# Patient Record
Sex: FEMALE | Race: WHITE | HISPANIC OR LATINO | Employment: FULL TIME | URBAN - METROPOLITAN AREA
[De-identification: names, ages, dates, MRNs, and addresses within clinical notes are randomized per-mention and may not be internally consistent; named-entity substitution may affect disease eponyms.]

---

## 2019-11-11 ENCOUNTER — HOSPITAL ENCOUNTER (OUTPATIENT)
Dept: LAB | Facility: MEDICAL CENTER | Age: 54
End: 2019-11-11
Attending: NURSE PRACTITIONER
Payer: COMMERCIAL

## 2019-11-11 LAB
ALBUMIN SERPL BCP-MCNC: 3.8 G/DL (ref 3.2–4.9)
ALBUMIN/GLOB SERPL: 1.1 G/DL
ALP SERPL-CCNC: 76 U/L (ref 30–99)
ALT SERPL-CCNC: 19 U/L (ref 2–50)
ANION GAP SERPL CALC-SCNC: 5 MMOL/L (ref 0–11.9)
AST SERPL-CCNC: 16 U/L (ref 12–45)
BASOPHILS # BLD AUTO: 0.4 % (ref 0–1.8)
BASOPHILS # BLD: 0.02 K/UL (ref 0–0.12)
BILIRUB SERPL-MCNC: 0.5 MG/DL (ref 0.1–1.5)
BUN SERPL-MCNC: 13 MG/DL (ref 8–22)
CALCIUM SERPL-MCNC: 8.9 MG/DL (ref 8.5–10.5)
CHLORIDE SERPL-SCNC: 103 MMOL/L (ref 96–112)
CHOLEST SERPL-MCNC: 213 MG/DL (ref 100–199)
CO2 SERPL-SCNC: 29 MMOL/L (ref 20–33)
CREAT SERPL-MCNC: 0.56 MG/DL (ref 0.5–1.4)
EOSINOPHIL # BLD AUTO: 0.14 K/UL (ref 0–0.51)
EOSINOPHIL NFR BLD: 2.5 % (ref 0–6.9)
ERYTHROCYTE [DISTWIDTH] IN BLOOD BY AUTOMATED COUNT: 42.5 FL (ref 35.9–50)
EST. AVERAGE GLUCOSE BLD GHB EST-MCNC: 209 MG/DL
GLOBULIN SER CALC-MCNC: 3.5 G/DL (ref 1.9–3.5)
GLUCOSE SERPL-MCNC: 123 MG/DL (ref 65–99)
HBA1C MFR BLD: 8.9 % (ref 0–5.6)
HCT VFR BLD AUTO: 39.3 % (ref 37–47)
HDLC SERPL-MCNC: 62 MG/DL
HGB BLD-MCNC: 12.9 G/DL (ref 12–16)
IMM GRANULOCYTES # BLD AUTO: 0 K/UL (ref 0–0.11)
IMM GRANULOCYTES NFR BLD AUTO: 0 % (ref 0–0.9)
LDLC SERPL CALC-MCNC: 135 MG/DL
LYMPHOCYTES # BLD AUTO: 1.91 K/UL (ref 1–4.8)
LYMPHOCYTES NFR BLD: 34.6 % (ref 22–41)
MCH RBC QN AUTO: 27.6 PG (ref 27–33)
MCHC RBC AUTO-ENTMCNC: 32.8 G/DL (ref 33.6–35)
MCV RBC AUTO: 84.2 FL (ref 81.4–97.8)
MONOCYTES # BLD AUTO: 0.44 K/UL (ref 0–0.85)
MONOCYTES NFR BLD AUTO: 8 % (ref 0–13.4)
NEUTROPHILS # BLD AUTO: 3.01 K/UL (ref 2–7.15)
NEUTROPHILS NFR BLD: 54.5 % (ref 44–72)
NRBC # BLD AUTO: 0 K/UL
NRBC BLD-RTO: 0 /100 WBC
PLATELET # BLD AUTO: 309 K/UL (ref 164–446)
PMV BLD AUTO: 11.7 FL (ref 9–12.9)
POTASSIUM SERPL-SCNC: 4.1 MMOL/L (ref 3.6–5.5)
PROT SERPL-MCNC: 7.3 G/DL (ref 6–8.2)
RBC # BLD AUTO: 4.67 M/UL (ref 4.2–5.4)
SODIUM SERPL-SCNC: 137 MMOL/L (ref 135–145)
TRIGL SERPL-MCNC: 79 MG/DL (ref 0–149)
WBC # BLD AUTO: 5.5 K/UL (ref 4.8–10.8)

## 2019-11-11 PROCEDURE — 80061 LIPID PANEL: CPT

## 2019-11-11 PROCEDURE — 36415 COLL VENOUS BLD VENIPUNCTURE: CPT

## 2019-11-11 PROCEDURE — 83036 HEMOGLOBIN GLYCOSYLATED A1C: CPT

## 2019-11-11 PROCEDURE — 85025 COMPLETE CBC W/AUTO DIFF WBC: CPT

## 2019-11-11 PROCEDURE — 80053 COMPREHEN METABOLIC PANEL: CPT

## 2019-11-27 ENCOUNTER — ANTICOAGULATION VISIT (OUTPATIENT)
Dept: VASCULAR LAB | Facility: MEDICAL CENTER | Age: 54
End: 2019-11-27
Attending: INTERNAL MEDICINE
Payer: COMMERCIAL

## 2019-11-27 DIAGNOSIS — I82.4Y9 DEEP VEIN THROMBOSIS (DVT) OF PROXIMAL LOWER EXTREMITY, UNSPECIFIED CHRONICITY, UNSPECIFIED LATERALITY (HCC): ICD-10-CM

## 2019-11-27 PROBLEM — I82.409 DEEP VEIN THROMBOSIS (HCC): Status: ACTIVE | Noted: 2019-11-27

## 2019-11-27 PROCEDURE — 99213 OFFICE O/P EST LOW 20 MIN: CPT | Performed by: PHARMACIST

## 2019-11-28 NOTE — PROGRESS NOTES
Indication: 2nd DVT     Drug: Eliquis 5mg BID    Saw patient today using  over phone, total visit time was over a hour.       S/O:  Pt reports that she got a second idiopathic VTE about 7-8 months ago while in [place]. Was started on Eliquis 5mg QD. Was on this for some time and then once she came here was changed to Eliquis 5mg BID. Since this time the pain in her leg remains ana when she's been sitting for a while and she has pain/tingling in arm off and on.     Eliquis has become too expensive and is looking into cheaper alternatives that's why she was referred to the clinic. 30 day supply $120.     Bleeding Risk Assessment     NO -  Epistaxis   Pt denies any excessive or unusual bleeding/hematomas.  Pt denies any GI bleeds or hematemesis.  Pt denies any concerning daily headache or sub dural hematoma symptoms.     Pt denies any hematuria or abnormal vaginal bleeding.   Latest Hemoglobin - needs labs   ETOH overuse - denies     Creatinine Clearance/Renal Function     Latest ClCr - needs labs     Drug Interactions   Platelets: - needs labs   ASA/other antiplatelets - no   NSAID - was using naproxen PRN, advised to avoid and only use APAP   Other drug interactions - none    Verified no anticonvulsant or azole therapy, education provided for future use.      Final Assessment and Recommendations:   At this point it's hard to figure out the time line. Of concern is that she was under dosed on Eliquis for months with the DVT and yet pain remains and arm pain is present. Gave patient Eliquis 5mg samples and instructed her to take 10mg BID for the next 7 days.     Follow up:  Set Appt with Dr. Moore for 1 week.        Kary Rosenberg, PharmD

## 2019-12-05 ENCOUNTER — OFFICE VISIT (OUTPATIENT)
Dept: VASCULAR LAB | Facility: MEDICAL CENTER | Age: 54
End: 2019-12-05
Attending: INTERNAL MEDICINE
Payer: COMMERCIAL

## 2019-12-05 VITALS
BODY MASS INDEX: 36.63 KG/M2 | HEART RATE: 82 BPM | DIASTOLIC BLOOD PRESSURE: 44 MMHG | HEIGHT: 61 IN | SYSTOLIC BLOOD PRESSURE: 112 MMHG | WEIGHT: 194 LBS

## 2019-12-05 DIAGNOSIS — E11.65 UNCONTROLLED TYPE 2 DIABETES MELLITUS WITH HYPERGLYCEMIA (HCC): ICD-10-CM

## 2019-12-05 DIAGNOSIS — I82.4Y9 DEEP VEIN THROMBOSIS (DVT) OF PROXIMAL LOWER EXTREMITY, UNSPECIFIED CHRONICITY, UNSPECIFIED LATERALITY (HCC): ICD-10-CM

## 2019-12-05 DIAGNOSIS — Z79.01 CHRONIC ANTICOAGULATION: ICD-10-CM

## 2019-12-05 DIAGNOSIS — E78.49 OTHER HYPERLIPIDEMIA: ICD-10-CM

## 2019-12-05 DIAGNOSIS — Z86.718 HISTORY OF DVT (DEEP VEIN THROMBOSIS): ICD-10-CM

## 2019-12-05 PROCEDURE — 99212 OFFICE O/P EST SF 10 MIN: CPT | Performed by: FAMILY MEDICINE

## 2019-12-05 PROCEDURE — 99214 OFFICE O/P EST MOD 30 MIN: CPT | Performed by: FAMILY MEDICINE

## 2019-12-05 RX ORDER — LISINOPRIL 2.5 MG/1
TABLET ORAL
COMMUNITY

## 2019-12-05 RX ORDER — ATORVASTATIN CALCIUM 20 MG/1
TABLET, FILM COATED ORAL
COMMUNITY

## 2019-12-05 RX ORDER — PEN NEEDLE, DIABETIC 31 G X1/4"
NEEDLE, DISPOSABLE MISCELLANEOUS
Refills: 1 | COMMUNITY
Start: 2019-09-13

## 2019-12-05 ASSESSMENT — ENCOUNTER SYMPTOMS
BLOOD IN STOOL: 0
NAUSEA: 0
PALPITATIONS: 0
CHILLS: 0
INSOMNIA: 0
WEAKNESS: 0
BRUISES/BLEEDS EASILY: 0
DEPRESSION: 0
DIZZINESS: 0
ABDOMINAL PAIN: 0
BLURRED VISION: 0
HEMOPTYSIS: 0
FEVER: 0
TREMORS: 0
SEIZURES: 0
MYALGIAS: 1
HEADACHES: 0
NERVOUS/ANXIOUS: 0
SHORTNESS OF BREATH: 0
ORTHOPNEA: 0
DOUBLE VISION: 0
VOMITING: 0
DIARRHEA: 0
SORE THROAT: 0
FOCAL WEAKNESS: 0
WHEEZING: 0
COUGH: 0

## 2019-12-05 NOTE — PROGRESS NOTES
INITIAL VASCULAR VISIT  Subjective:   Kaya Keita is a 53 y.o. female who presents today 12/5/2019 for   Chief Complaint   Patient presents with   • Follow-Up     Deep Vein Thrombosis   Referred for LOT determination of anticoagulation in context of acute venothromboembolic disease    HPI:    VTE disease / Anticoagulation:   Current symptoms:  Reports intermittent RLE lateral thigh pain and right heel pain.  Believes pain differs from prior DVT-related pain.  Has hx of plantar fasciitis.  Having leg cramps intermittmently.  Denies current SOB, CP, leg cyanosis of digits, redness of extremities.  Anticoagulant: eliquis, having trouble with cost   Complications: none, tolerating well, no bleeding or bruising   Date of initiation of anticoagulation: ? 4/2019   Adherence: reports complete, no missed doses      Pertinent VTE pmhx:   Date of Diagnosis: around3/2019  Type of Venous thromboembolic disease (VTE): acute BLE DVT   Type of imaging: duplex in [place]  Preceding/presenting symptoms: severe bilat leg pains  VTE tx course: dx in [place], started on eliquis 5mg once daily in [place], then increased to 5mg BID once in .  Seen by clinical pharm on 11/27 and referred for eval due to expense of eliquis and pain issues.   Any personal VTE hx? Yes, Details: one additional prior unprovoked DVT , 6mo prior to 3/2019, used Eliquis for 2mo only  Any family VTE hx? No    UNPROVOKED VS PROVOKED:   Recent surgery ? No  Recent trauma ? No  Smoker? No  Extended travel? No  Other periods of immobility? No  Other risk factors for VTE disease:  none  WOMEN: Colorectal CA screening: no       Cervical CA screening: no       Breast CA screening: no       Any estrogen, testosterone HRT, E2 birth control, tamoxifene, raloxifene: no       Hx of recurrent miscarriages: no  Hypercoaguability work-up completed?  NO    Past Medical History:   Diagnosis Date   • Deep venous thrombosis (HCC)     twice       History reviewed. No  pertinent surgical history.     Family History   Problem Relation Age of Onset   • Clotting Disorder Neg Hx      Social History     Tobacco Use   Smoking Status Never Smoker   Smokeless Tobacco Never Used     Social History     Tobacco Use   • Smoking status: Never Smoker   • Smokeless tobacco: Never Used   Substance Use Topics   • Alcohol use: Not on file   • Drug use: Not on file     Outpatient Encounter Medications as of 12/5/2019   Medication Sig Dispense Refill   • insulin detemir (LEVEMIR FLEXTOUCH) 100 UNIT/ML injection PEN Inject 20 Units as instructed 2 times a day.     • apixaban (ELIQUIS) 5mg Tab Take 1 Tab by mouth 2 Times a Day for 30 days. 60 Tab 0   • TRUEPLUS PEN NEEDLES 31G X 6 MM Misc USE FOR INJECTING INSULIN ONE TIME PER DAY  1   • atorvastatin (LIPITOR) 20 MG Tab atorvastatin 20 mg tablet   Take 1 tablet every day by oral route for 90 days.     • lisinopril (PRINIVIL) 2.5 MG Tab lisinopril 2.5 mg tablet   Take 1 tablet every day by oral route for 90 days.     • metformin (GLUCOPHAGE) 1000 MG tablet metformin 1,000 mg tablet   Take 1 tablet twice a day by oral route.     • [DISCONTINUED] insulin detemir (LEVEMIR FLEXTOUCH) 100 UNIT/ML injection PEN Levemir FlexTouch U-100 Insulin 100 unit/mL (3 mL) subcutaneous pen   Inject 20 units twice a day by subcutaneous route.     • [DISCONTINUED] insulin detemir (LEVEMIR) 100 UNIT/ML Solution Inject  as instructed every evening.     • [DISCONTINUED] LISINOPRIL PO Take  by mouth.     • [DISCONTINUED] METFORMIN HCL PO Take  by mouth.     • [DISCONTINUED] ATORVASTATIN CALCIUM PO Take  by mouth.     • [DISCONTINUED] apixaban (ELIQUIS) 5mg Tab Take 5 mg by mouth 2 Times a Day.       No facility-administered encounter medications on file as of 12/5/2019.      Not on File  DIET AND EXERCISE:  Weight Change:stable   BMI Readings from Last 4 Encounters:   12/05/19 36.66 kg/m²      Diet: common adult  Exercise: minimal exercise     Review of Systems  "  Constitutional: Negative for chills, fever and malaise/fatigue.   HENT: Negative for nosebleeds, sore throat and tinnitus.    Eyes: Negative for blurred vision and double vision.   Respiratory: Negative for cough, hemoptysis, shortness of breath and wheezing.    Cardiovascular: Negative for chest pain, palpitations, orthopnea and leg swelling.   Gastrointestinal: Negative for abdominal pain, blood in stool, diarrhea, melena, nausea and vomiting.   Genitourinary: Negative for hematuria.   Musculoskeletal: Positive for joint pain and myalgias.   Skin: Negative for itching and rash.   Neurological: Negative for dizziness, tremors, focal weakness, seizures, weakness and headaches.   Endo/Heme/Allergies: Does not bruise/bleed easily.   Psychiatric/Behavioral: Negative for depression. The patient is not nervous/anxious and does not have insomnia.       Objective:     Vitals:    12/05/19 0856   BP: 112/44   BP Location: Left arm   Patient Position: Sitting   BP Cuff Size: Adult   Pulse: 82   Weight: 88 kg (194 lb)   Height: 1.549 m (5' 1\")         BP Readings from Last 5 Encounters:   12/05/19 112/44      Body mass index is 36.66 kg/m².  Physical Exam  Vitals signs reviewed.   Constitutional:       Appearance: Normal appearance.   HENT:      Head: Normocephalic and atraumatic.      Nose: Nose normal.      Mouth/Throat:      Mouth: Mucous membranes are moist.      Pharynx: Oropharynx is clear.   Eyes:      Extraocular Movements: Extraocular movements intact.      Conjunctiva/sclera: Conjunctivae normal.   Neck:      Musculoskeletal: Normal range of motion and neck supple.   Cardiovascular:      Rate and Rhythm: Normal rate and regular rhythm.      Pulses: Normal pulses.           Carotid pulses are 2+ on the right side and 2+ on the left side.       Radial pulses are 2+ on the right side and 2+ on the left side.        Dorsalis pedis pulses are 2+ on the right side and 2+ on the left side.        Posterior tibial pulses " are 2+ on the right side and 2+ on the left side.      Heart sounds: Normal heart sounds.      Comments:    Spider telangectasia:       RLE:  None      LLE: none   Varicosities:           RLE: none      LLE: none   Corona phlebectatica:      RLE:  None        LLE:  None   Cording:         RLE:  None     LLE: None     Pulmonary:      Effort: Pulmonary effort is normal.      Breath sounds: Normal breath sounds.   Abdominal:      General: Abdomen is flat. Bowel sounds are normal.      Palpations: Abdomen is soft.   Musculoskeletal:      Right lower leg: No edema.      Left lower leg: No edema.   Skin:     General: Skin is warm and dry.      Capillary Refill: Capillary refill takes less than 2 seconds.   Neurological:      General: No focal deficit present.      Mental Status: She is alert and oriented to person, place, and time. Mental status is at baseline.   Psychiatric:         Mood and Affect: Mood normal.         Behavior: Behavior normal.       Lab Results   Component Value Date    CHOLSTRLTOT 213 (H) 11/11/2019     (H) 11/11/2019    HDL 62 11/11/2019    TRIGLYCERIDE 79 11/11/2019         Lab Results   Component Value Date    HBA1C 8.9 (H) 11/11/2019      Lab Results   Component Value Date    SODIUM 137 11/11/2019    POTASSIUM 4.1 11/11/2019    CHLORIDE 103 11/11/2019    CO2 29 11/11/2019    GLUCOSE 123 (H) 11/11/2019    BUN 13 11/11/2019    CREATININE 0.56 11/11/2019    IFAFRICA >60 11/11/2019    IFNOTAFR >60 11/11/2019        Lab Results   Component Value Date    WBC 5.5 11/11/2019    RBC 4.67 11/11/2019    HEMOGLOBIN 12.9 11/11/2019    HEMATOCRIT 39.3 11/11/2019    MCV 84.2 11/11/2019    MCH 27.6 11/11/2019    MCHC 32.8 (L) 11/11/2019    MPV 11.7 11/11/2019      VASCULAR IMAGING:     Last EKG: No results found for this or any previous visit.      Medical Decision Making:  Today's Assessment / Status / Plan:     1. Deep vein thrombosis (DVT) of proximal lower extremity, unspecified chronicity,  unspecified laterality (HCC)  D-DIMER   2. Chronic anticoagulation     3. History of DVT (deep vein thrombosis)     4. Other hyperlipidemia     5. Uncontrolled type 2 diabetes mellitus with hyperglycemia (HCC)        Patient Type: Primary Prevention    Etiology of Established CVD if Present:   1) acute DVT     Anticoagulation:  Indication for anticoagulation: recurrent acute DVT   Anti-Platelet/Anti-Coagulant Tx: yes  Appears to have unprovoked, isolated acute DVT (presumed RLE, but unclear based upon hx from pt if is both).  Initial event occurred 9/2018 and did not receive appropriate intensity or LOT to achieve full treatment, so consider untreated/partially treated and then worsening sx that we will consider to be ongoing acute on chronic DVT and not 2 isolated events.  Due to unprovoked, does have 26% chance of recurrence over 5yrs, so we will consider further risk eval with repeat D-dimer and reserve possible eval for thrombophilia testing as optional.  High risk of recurrence with low risk of bleed favors indefinite therapy, but pt does not favor this approach and expense of meds with lack of insurance weighs on her decision-making.    HAS-BLED bleeding risk calc (mdcalc.com): 0 pts, 0.9% annual risk   Anti-Coagulation Plan:  - continue eliquis 5mg BID for additional 3mo, then f/u to discuss extension vs cessation  - provided Rx card for 30-day supply and additional 30-day samples, pt to contact our office at the end of the 2nd month to received an additional 1mo supply to complete full 3mo therapy   - check D-dimer prior to appt   - counseled on signs and symptoms of acute VTE that require seeking prompt attention in the ED to include shortness of breath, chest pain, pain with deep inhalation, acute leg swelling and/or pain in calf or leg   - elevate legs as much as possible, use compression stockings/socks if directed by your provider  - Avoid hormonal therapies including estrogen or testosterone-containing  meds, or raloxifene or tamoxifene (commonly used for osteoporosis)  - Avoid sedentary periods  - continue complete avoidance of tobacco products  - if having any invasive procedure,please make sure the doctor knows of your history of blood clots and current anticoagulation status  - avoid Aspirin and anti-inflammatories (eg. Advil, ibuprofen, Aleve, naproxen, etc) while anticoagulated   - avoid skiing or other dangerous activities to reduce risk of head injury and brain bleeds  - recommended to see your PCP to discuss if you need age-appropriate cancer screenings as a small % of blood clots may be caused by an underlying malignancy  - if any bleeding lasting 30min without stopping, please seek care with your PCP, urgent care, or ED  - reversal agents for most blood thinners are now available and used if you have major bleeding    Lipid Management: Qualifies for Statin Therapy Based on 2018 ACC/AHA Guidelines: yes  Calculated 10-Year Risk of ASCVD: N/A  Currently on Statin: yes  NLA Risk Category:   High:  T2D and 0-1 RF and no evidence of end-org damage  Tx threshold: non-HDL-C >129, LDL-C >99  Tx goal:  non-HDL <130, LDL-C <100 (optional: apoB<90)   At goal:  no  Plan:  - reinforced ongoing TLC measures   - recommend to increase atorva to 40-80mg as needed to achieve LDL goal  - defer mgmt and monitoring to PCP     Blood Pressure Management:Goal: ACC/AHA (2017) goal <130/80  Home BP at goal:  yes  Office BP at goal:  yes  Echo: N/A  ACR: not available   Device candidate? no  Plan:   Monitoring:   - start/continue home BP monitoring, reviewed correct technique, provide BP log and instructions  - order 24h ABPM:  NO  - monitor lytes/gfr routinely   - contact office if BP consistently >140/>90 to discussion of tx adjustments   - defer ongoing care to PCP   Medications:  ACEi/ARB: continue lisinopril 2.5mg daily, titrate as needed for BP goals in the future   DHP-CCB: none   Thiazide: none  Other: none     Glycemic  Status: Diabetic, T2, insulin-requiring    Last A1c = 8.9  Goal A1c < 7.0  ACR: not examined  Plan:  - continue metformin 1000mg BID  - continue levermir flextouch 20mg BID, adjust per PCP  - consider addition of SGLT2i vs weekly GLP1RA as additional options   - recommmend for routine care with PCP (or endocrine) to include regular A1c monitoring, annual albumin/creatinine ratio (ACR), annual diabetic retinopathy screening, foot exams, annual flu vaccine, and updates to pneumonia vaccines as appropriate     Smoking:  continued complete avoidance of all tobacco products     Physical Activity: continue healthy activity to improve CV fitness, see care instructions for additional details     Weight Management and Nutrition: Dietary plan was discussed with patient at this visit including DASH, low sodium and/or as outlined in care instructions     Other:   1) leg pain.  Ddx: post-DVT syndrome, spasms, cramping, plantar fasciitis.  Based upon exam does not appear to be DVT-related   - recommend tylenol, compression, topical analgesics, magnesium 500mg OTC  - f/u with PCP for eval for possible plantar fasc issues     Instructed to follow-up with PCP for remainder of adult medical needs: yes  We will partner with other providers in the management of established vascular disease and cardiometabolic risk factors.    Studies to Be Obtained: none  Labs to Be Obtained: D-dimer in 3mo    Follow up in: 3 months with ivana Moore M.D.

## 2019-12-05 NOTE — PROGRESS NOTES
Pt states she is having cramp like pain in her right leg for the past 3 days. However, yesterday she felt extreme pain and a lot of pressure especially in the foot area.

## 2019-12-06 ENCOUNTER — TELEPHONE (OUTPATIENT)
Dept: VASCULAR LAB | Facility: MEDICAL CENTER | Age: 54
End: 2019-12-06

## 2019-12-07 NOTE — TELEPHONE ENCOUNTER
Faxed 12/5 encounter notes to [facility] att:Sherice Meier F:[phone]  **Confirmed by Charley that they received it

## 2020-02-19 ENCOUNTER — HOSPITAL ENCOUNTER (OUTPATIENT)
Dept: LAB | Facility: MEDICAL CENTER | Age: 55
End: 2020-02-19
Attending: NURSE PRACTITIONER
Payer: COMMERCIAL

## 2020-02-19 LAB
CHOLEST SERPL-MCNC: 163 MG/DL (ref 100–199)
EST. AVERAGE GLUCOSE BLD GHB EST-MCNC: 243 MG/DL
FASTING STATUS PATIENT QL REPORTED: NORMAL
HBA1C MFR BLD: 10.1 % (ref 0–5.6)
HDLC SERPL-MCNC: 54 MG/DL
LDLC SERPL CALC-MCNC: 89 MG/DL
TRIGL SERPL-MCNC: 99 MG/DL (ref 0–149)

## 2020-02-19 PROCEDURE — 36415 COLL VENOUS BLD VENIPUNCTURE: CPT

## 2020-02-19 PROCEDURE — 80061 LIPID PANEL: CPT

## 2020-02-19 PROCEDURE — 83036 HEMOGLOBIN GLYCOSYLATED A1C: CPT

## 2020-03-12 ENCOUNTER — OFFICE VISIT (OUTPATIENT)
Dept: VASCULAR LAB | Facility: MEDICAL CENTER | Age: 55
End: 2020-03-12
Attending: INTERNAL MEDICINE
Payer: COMMERCIAL

## 2020-03-12 VITALS
DIASTOLIC BLOOD PRESSURE: 58 MMHG | BODY MASS INDEX: 37.49 KG/M2 | SYSTOLIC BLOOD PRESSURE: 117 MMHG | HEIGHT: 61 IN | HEART RATE: 72 BPM | WEIGHT: 198.6 LBS

## 2020-03-12 DIAGNOSIS — Z86.718 HISTORY OF DVT (DEEP VEIN THROMBOSIS): ICD-10-CM

## 2020-03-12 DIAGNOSIS — I87.2 CHRONIC VENOUS INSUFFICIENCY: ICD-10-CM

## 2020-03-12 DIAGNOSIS — M79.605 PAIN IN BOTH LOWER EXTREMITIES: ICD-10-CM

## 2020-03-12 DIAGNOSIS — E11.65 UNCONTROLLED TYPE 2 DIABETES MELLITUS WITH HYPERGLYCEMIA (HCC): ICD-10-CM

## 2020-03-12 DIAGNOSIS — M79.604 PAIN IN BOTH LOWER EXTREMITIES: ICD-10-CM

## 2020-03-12 DIAGNOSIS — I82.4Y9 DEEP VEIN THROMBOSIS (DVT) OF PROXIMAL LOWER EXTREMITY, UNSPECIFIED CHRONICITY, UNSPECIFIED LATERALITY (HCC): ICD-10-CM

## 2020-03-12 DIAGNOSIS — E78.49 OTHER HYPERLIPIDEMIA: ICD-10-CM

## 2020-03-12 DIAGNOSIS — Z79.01 CHRONIC ANTICOAGULATION: ICD-10-CM

## 2020-03-12 LAB — INR PPP: 1 (ref 2–3.5)

## 2020-03-12 PROCEDURE — 99213 OFFICE O/P EST LOW 20 MIN: CPT | Performed by: FAMILY MEDICINE

## 2020-03-12 PROCEDURE — 85610 PROTHROMBIN TIME: CPT | Performed by: FAMILY MEDICINE

## 2020-03-12 PROCEDURE — 99214 OFFICE O/P EST MOD 30 MIN: CPT | Performed by: FAMILY MEDICINE

## 2020-03-12 RX ORDER — ASPIRIN 81 MG/1
81 TABLET ORAL DAILY
Qty: 30 TAB | COMMUNITY
End: 2020-03-12

## 2020-03-12 RX ORDER — WARFARIN SODIUM 5 MG/1
5 TABLET ORAL DAILY
Qty: 30 TAB | Refills: 0 | Status: SHIPPED | OUTPATIENT
Start: 2020-03-12 | End: 2020-03-23 | Stop reason: SDUPTHER

## 2020-03-12 ASSESSMENT — ENCOUNTER SYMPTOMS
CHILLS: 0
MYALGIAS: 1
INSOMNIA: 0
WHEEZING: 0
WEAKNESS: 0
NAUSEA: 0
HEMOPTYSIS: 0
TREMORS: 0
FEVER: 0
PALPITATIONS: 0
FOCAL WEAKNESS: 0
HEADACHES: 0
SEIZURES: 0
DIZZINESS: 0
DIARRHEA: 0
VOMITING: 0
SHORTNESS OF BREATH: 0
BLURRED VISION: 0
DEPRESSION: 0
ORTHOPNEA: 0
BRUISES/BLEEDS EASILY: 0
SORE THROAT: 0
DOUBLE VISION: 0
BLOOD IN STOOL: 0
COUGH: 0
ABDOMINAL PAIN: 0
NERVOUS/ANXIOUS: 0

## 2020-03-12 ASSESSMENT — FIBROSIS 4 INDEX: FIB4 SCORE: 0.62

## 2020-03-12 NOTE — PATIENT INSTRUCTIONS
- stop eliquis, start warfarin, get INRs   - see vascular surgery   - continue current meds       - counseled on signs and symptoms of acute VTE that require seeking prompt attention in the ED to include shortness of breath, chest pain, pain with deep inhalation, acute leg swelling and/or pain in calf or leg

## 2020-03-12 NOTE — PROGRESS NOTES
FOLLOW-UP VASCULAR VISIT  Subjective:   Kaya Keita is a 54 y.o. female who presents today 3/12/2019 for   Chief Complaint   Patient presents with   • Follow-Up     DVT , labs   Referred for LOT determination of anticoagulation in context of acute venothromboembolic disease    HPI:    VTE disease / Anticoagulation:   Current symptoms:  Persistent pain in left leg despite current therapy.  Had duplex in [place] (scanned to chart) showing venous reflux bilat.  Appears to be superficial based upon my translation of the Lao.  She has not started compression socks yet.    Denies current SOB, CP, leg cyanosis of digits, redness of extremities.  Anticoagulant: eliquis, having trouble with cost   Complications: none, tolerating well, no bleeding or bruising   Date of initiation of anticoagulation: around 4/2019   Adherence: reports complete, no missed doses      Pertinent VTE pmhx:   Date of Diagnosis: around3/2019  Type of Venous thromboembolic disease (VTE): acute BLE DVT   Type of imaging: duplex in [place]  Preceding/presenting symptoms: severe bilat leg pains  VTE tx course: dx in [place], started on eliquis 5mg once daily in [place], then increased to 5mg BID once in .  Seen by clinical pharm on 11/27 and referred for eval due to expense of eliquis and pain issues.   Any personal VTE hx? Yes, Details: one additional prior unprovoked DVT , 6mo prior to 3/2019, used Eliquis for 2mo only  Any family VTE hx? No    UNPROVOKED VS PROVOKED:   Recent surgery ? No  Recent trauma ? No  Smoker? No  Extended travel? No  Other periods of immobility? No  Other risk factors for VTE disease:  none  WOMEN: Colorectal CA screening: no       Cervical CA screening: no       Breast CA screening: no       Any estrogen, testosterone HRT, E2 birth control, tamoxifene, raloxifene: no       Hx of recurrent miscarriages: no  Hypercoaguability work-up completed?  NO    T2D:  Stable. No current symptoms reported.   Tolerating meds  and no recent med changes.   Home blood sugars stable, reports no lows.   Last A1c 10.1    Hyperlipidemia:    Stable, no current concerns  Current treatment: atorva 20mg   Myalgias? no  Other adverse drug reactions? no  Lipid profile:   Lab Results   Component Value Date/Time    CHOLSTRLTOT 163 02/19/2020 0926    TRIGLYCERIDE 99 02/19/2020 0926    HDL 54 02/19/2020 0926    LDL 89 02/19/2020 0926     LDL (mg/dL)   Date Value   02/19/2020 89   11/11/2019 135 (H)     HDL (mg/dL)   Date Value   02/19/2020 54   11/11/2019 62       Social History     Tobacco Use   Smoking Status Never Smoker   Smokeless Tobacco Never Used     Social History     Tobacco Use   • Smoking status: Never Smoker   • Smokeless tobacco: Never Used   Substance Use Topics   • Alcohol use: Not on file   • Drug use: Not on file     Outpatient Encounter Medications as of 3/12/2020   Medication Sig Dispense Refill   • warfarin (COUMADIN) 5 MG Tab Take 1 Tab by mouth every day. 30 Tab 0   • TRUEPLUS PEN NEEDLES 31G X 6 MM Misc USE FOR INJECTING INSULIN ONE TIME PER DAY  1   • atorvastatin (LIPITOR) 20 MG Tab atorvastatin 20 mg tablet   Take 1 tablet every day by oral route for 90 days.     • lisinopril (PRINIVIL) 2.5 MG Tab lisinopril 2.5 mg tablet   Take 1 tablet every day by oral route for 90 days.     • metformin (GLUCOPHAGE) 1000 MG tablet metformin 1,000 mg tablet   Take 1 tablet twice a day by oral route.     • insulin detemir (LEVEMIR FLEXTOUCH) 100 UNIT/ML injection PEN Inject 20 Units as instructed 2 times a day.     • [DISCONTINUED] aspirin 81 MG EC tablet Take 1 Tab by mouth every day. 30 Tab      No facility-administered encounter medications on file as of 3/12/2020.      Not on File  DIET AND EXERCISE:  Weight Change:stable   BMI Readings from Last 4 Encounters:   03/12/20 37.98 kg/m²   12/05/19 36.66 kg/m²      Diet: common adult  Exercise: minimal exercise     Review of Systems   Constitutional: Negative for chills, fever and  "malaise/fatigue.   HENT: Negative for nosebleeds, sore throat and tinnitus.    Eyes: Negative for blurred vision and double vision.   Respiratory: Negative for cough, hemoptysis, shortness of breath and wheezing.    Cardiovascular: Negative for chest pain, palpitations, orthopnea and leg swelling.   Gastrointestinal: Negative for abdominal pain, blood in stool, diarrhea, melena, nausea and vomiting.   Genitourinary: Negative for hematuria.   Musculoskeletal: Positive for joint pain and myalgias.   Skin: Negative for itching and rash.   Neurological: Negative for dizziness, tremors, focal weakness, seizures, weakness and headaches.   Endo/Heme/Allergies: Does not bruise/bleed easily.   Psychiatric/Behavioral: Negative for depression. The patient is not nervous/anxious and does not have insomnia.       Objective:     Vitals:    03/12/20 0903   BP: 117/58   BP Location: Left arm   Patient Position: Sitting   BP Cuff Size: Adult   Pulse: 72   Weight: 90.1 kg (198 lb 9.6 oz)   Height: 1.54 m (5' 0.63\")         BP Readings from Last 5 Encounters:   03/12/20 117/58   12/05/19 112/44      Body mass index is 37.98 kg/m².  Physical Exam  Vitals signs reviewed.   Constitutional:       Appearance: Normal appearance.   HENT:      Head: Normocephalic and atraumatic.      Nose: Nose normal.      Mouth/Throat:      Mouth: Mucous membranes are moist.      Pharynx: Oropharynx is clear.   Eyes:      Extraocular Movements: Extraocular movements intact.      Conjunctiva/sclera: Conjunctivae normal.   Neck:      Musculoskeletal: Normal range of motion and neck supple.   Cardiovascular:      Rate and Rhythm: Normal rate and regular rhythm.      Pulses: Normal pulses.           Carotid pulses are 2+ on the right side and 2+ on the left side.       Radial pulses are 2+ on the right side and 2+ on the left side.        Dorsalis pedis pulses are 2+ on the right side and 2+ on the left side.        Posterior tibial pulses are 2+ on the right " side and 2+ on the left side.      Heart sounds: Normal heart sounds.      Comments:    Spider telangectasia:       RLE:  None      LLE: none   Varicosities:           RLE: none      LLE: none   Corona phlebectatica:      RLE:  None        LLE:  None   Cording:         RLE:  None     LLE: None     Pulmonary:      Effort: Pulmonary effort is normal.      Breath sounds: Normal breath sounds.   Abdominal:      General: Abdomen is flat. Bowel sounds are normal.      Palpations: Abdomen is soft.   Musculoskeletal:      Right lower leg: No edema.      Left lower leg: No edema.   Skin:     General: Skin is warm and dry.      Capillary Refill: Capillary refill takes less than 2 seconds.   Neurological:      General: No focal deficit present.      Mental Status: She is alert and oriented to person, place, and time. Mental status is at baseline.   Psychiatric:         Mood and Affect: Mood normal.         Behavior: Behavior normal.       Lab Results   Component Value Date    CHOLSTRLTOT 163 02/19/2020    LDL 89 02/19/2020    HDL 54 02/19/2020    TRIGLYCERIDE 99 02/19/2020      Lab Results   Component Value Date    INR 1.00 03/12/2020       Lab Results   Component Value Date    HBA1C 10.1 (H) 02/19/2020      Lab Results   Component Value Date    SODIUM 137 11/11/2019    POTASSIUM 4.1 11/11/2019    CHLORIDE 103 11/11/2019    CO2 29 11/11/2019    GLUCOSE 123 (H) 11/11/2019    BUN 13 11/11/2019    CREATININE 0.56 11/11/2019    IFAFRICA >60 11/11/2019    IFNOTAFR >60 11/11/2019        Lab Results   Component Value Date    WBC 5.5 11/11/2019    RBC 4.67 11/11/2019    HEMOGLOBIN 12.9 11/11/2019    HEMATOCRIT 39.3 11/11/2019    MCV 84.2 11/11/2019    MCH 27.6 11/11/2019    MCHC 32.8 (L) 11/11/2019    MPV 11.7 11/11/2019      VASCULAR IMAGING:     Last EKG: No results found for this or any previous visit.    Medical Decision Making:  Today's Assessment / Status / Plan:     1. Deep vein thrombosis (DVT) of proximal lower extremity,  unspecified chronicity, unspecified laterality (HCC)  POCT Protime    warfarin (COUMADIN) 5 MG Tab    REFERRAL TO ANTICOAGULATION MONITORING   2. Chronic anticoagulation  POCT Protime   3. History of DVT (deep vein thrombosis)  D-DIMER    REFERRAL TO VASCULAR SURGERY    POCT Protime   4. Other hyperlipidemia  POCT Protime   5. Uncontrolled type 2 diabetes mellitus with hyperglycemia (HCC)  POCT Protime   6. Chronic venous insufficiency  REFERRAL TO VASCULAR SURGERY    POCT Protime   7. Pain in both lower extremities  CREATINE KINASE    CRP HIGH SENSITIVE (CARDIAC)    US-SHANNON SINGLE LEVEL BILAT    POCT Protime      Patient Type: Primary Prevention and DM    Etiology of Established CVD if Present:   1) acute DVT     Anticoagulation:  Indication for anticoagulation: recurrent acute DVT   Anti-Platelet/Anti-Coagulant Tx: yes  HAS-BLED bleeding risk calc ([email or web address]): 0 pts, 0.9% annual risk   Appears to have unprovoked, isolated acute DVT (presumed RLE, but unclear based upon hx from pt if is both).   Initial event occurred 9/2018 and did not receive appropriate intensity or LOT to achieve full treatment, so consider untreated/partially treated and then worsening sx that we will consider to be ongoing acute on chronic DVT and not 2 isolated events.    Due to unprovoked, does have 26% chance of recurrence over 5yrs, so we will consider further risk eval with repeat D-dimer and reserve possible eval for thrombophilia testing as optional.  High risk of recurrence with low risk of bleed favors indefinite therapy.    Through shared decision-making we have opted to continue indefinite therapy but use VKA with INR testing to reduce costs.   Anti-Coagulation Plan:  - INR stable to stop eliquis, start VKA with routine INR testing due to cost and lack of insurance    - counseled on signs and symptoms of acute VTE that require seeking prompt attention in the ED to include shortness of breath, chest pain, pain with deep inhalation,  acute leg swelling and/or pain in calf or leg   - elevate legs as much as possible, use compression stockings/socks if directed by your provider  - Avoid hormonal therapies including estrogen or testosterone-containing meds, or raloxifene or tamoxifene (commonly used for osteoporosis)  - Avoid sedentary periods  - continue complete avoidance of tobacco products  - if having any invasive procedure,please make sure the doctor knows of your history of blood clots and current anticoagulation status  - avoid Aspirin and anti-inflammatories (eg. Advil, ibuprofen, Aleve, naproxen, etc) while anticoagulated   - avoid skiing or other dangerous activities to reduce risk of head injury and brain bleeds  - recommended to see your PCP to discuss if you need age-appropriate cancer screenings as a small % of blood clots may be caused by an underlying malignancy  - if any bleeding lasting 30min without stopping, please seek care with your PCP, urgent care, or ED  - reversal agents for most blood thinners are now available and used if you have major bleeding    Lipid Management: Qualifies for Statin Therapy Based on 2018 ACC/AHA Guidelines: yes  Calculated 10-Year Risk of ASCVD: N/A  Currently on Statin: yes  NLA Risk Category:   High:  T2D and 0-1 RF and no evidence of end-org damage  Tx threshold: non-HDL-C >129, LDL-C >99  Tx goal:  non-HDL <130, LDL-C <100 (optional: apoB<90)   At goal:  YES  Plan:  - reinforced ongoing TLC measures   - continue atorva 20mg, consider increase to 40-80mg to hit target prn   - defer mgmt and monitoring to PCP     Blood Pressure Management:Goal: ACC/AHA (2017) goal <130/80  Home BP at goal:  yes  Office BP at goal:  yes  Echo: N/A  ACR: not available   Device candidate? no  Plan:   Monitoring:   - start/continue home BP monitoring, reviewed correct technique, provide BP log and instructions  - order 24h ABPM:  NO  - monitor lytes/gfr routinely   - contact office if BP consistently >140/>90 to  discussion of tx adjustments   - defer ongoing care to PCP   Medications:  ACEi/ARB: continue lisinopril 2.5mg daily, titrate as needed for BP goals in the future   DHP-CCB: none   Thiazide: none  Other: none     Glycemic Status: Diabetic, T2, insulin-requiring    Last A1c = 10.1  Goal A1c < 7.0  ACR: not examined  Plan:  - defer mgmt to PCP   - continue metformin 1000mg BID  - continue levermir flextouch 20mg BID, adjust per PCP  - consider addition of SGLT2i vs weekly GLP1RA as additional options   - recommmend for routine care with PCP (or endocrine) to include regular A1c monitoring, annual albumin/creatinine ratio (ACR), annual diabetic retinopathy screening, foot exams, annual flu vaccine, and updates to pneumonia vaccines as appropriate     Smoking:  continued complete avoidance of all tobacco products     Physical Activity: continue healthy activity to improve CV fitness, see care instructions for additional details     Weight Management and Nutrition: Dietary plan was discussed with patient at this visit including DASH, low sodium and/or as outlined in care instructions     Other:   1) leg pain.  Ddx: post-DVT syndrome, spasms, cramping, plantar fasciitis.    Based upon exam does not appear to be DVT-related.  No prior eval for PAD.   DM induced swelling is a contributor   - recommend tylenol, compression, topical analgesics, magnesium 500mg OTC  - f/u with PCP for eval for possible plantar fasc issues   - check SHANNON    2) venous reflux disease.   - recommend eval with interventionalist per [facility] program to eval for possible procedural care   - could consider trial of vasculera vs horse chestnut seed extract for 2-3mo to see if helps     Instructed to follow-up with PCP for remainder of adult medical needs: yes  We will partner with other providers in the management of established vascular disease and cardiometabolic risk factors.    Studies to Be Obtained: none  Labs to Be Obtained: none     Follow up in: INR  checks ongoing, 3mo with me     Cayetano Moore M.D.     Anticoagulation Summary  As of 3/12/2020    INR goal:   2.0-3.0   TTR:   --   INR used for dosin.00! (3/12/2020)   Warfarin maintenance plan:   No maintenance plan   Next INR check:   3/16/2020   Target end date:   Indefinite    Indications    Deep vein thrombosis (DVT) of proximal lower extremity  unspecified chronicity  unspecified laterality (HCC) [I82.4Y9]             Anticoagulation Episode Summary     INR check location:       Preferred lab:       Send INR reminders to:       Comments:         Anticoagulation Care Providers     Provider Role Specialty Phone number    Cayetano Moore M.D. Referring Family Medicine [phone]    [facility] Responsible  [phone]        Anticoagulation Patient Findings    Utilized a  for today's service.     HPI:  Kaya Reg Keita seen in clinic today, on anticoagulation therapy with transition from Eliquis to warfarin.   Pt has hx of recurrent DVT, therefore I suggested to continue Eliquis until next appt and start warfarin 48 hours prior to next appt.  However, due to the  and pt's ability to understand, pt will D/C Eliquis on Saturday 3/14/20 and start warfarin 5 mg PO daily on the same day.  No overlap will occur as in this setting it is likely to cause medication errors, confusion, and may result in increased risk of bleeding.      A/P   INR  SUB-therapeutic.   INR baseline of 1.0, will check next INR after 48 hours of warfarin initiation.    Pt was overwhelmed with the amount of information today, please provide initial education at next INR.     Follow up appointment in 4 days.     Cayetano Moore M.D.

## 2020-03-16 ENCOUNTER — ANTICOAGULATION VISIT (OUTPATIENT)
Dept: VASCULAR LAB | Facility: MEDICAL CENTER | Age: 55
End: 2020-03-16
Attending: FAMILY MEDICINE
Payer: COMMERCIAL

## 2020-03-16 DIAGNOSIS — I82.4Y9 DEEP VEIN THROMBOSIS (DVT) OF PROXIMAL LOWER EXTREMITY, UNSPECIFIED CHRONICITY, UNSPECIFIED LATERALITY (HCC): ICD-10-CM

## 2020-03-16 LAB
INR BLD: 1 (ref 0.9–1.2)
INR PPP: 1 (ref 2–3.5)

## 2020-03-16 PROCEDURE — 99212 OFFICE O/P EST SF 10 MIN: CPT

## 2020-03-16 PROCEDURE — 85610 PROTHROMBIN TIME: CPT

## 2020-03-16 NOTE — PROGRESS NOTES
Anticoagulation Summary  As of 3/16/2020    INR goal:   2.0-3.0   TTR:   0.0 % (4 d)   INR used for dosin.00! (3/16/2020)   Warfarin maintenance plan:   5 mg (5 mg x 1) every day   Weekly warfarin total:   35 mg   Plan last modified:   Estelle Gray, PharmD (3/16/2020)   Next INR check:   3/20/2020   Target end date:   Indefinite    Indications    Deep vein thrombosis (DVT) of proximal lower extremity  unspecified chronicity  unspecified laterality (HCC) [I82.4Y9]             Anticoagulation Episode Summary     INR check location:       Preferred lab:       Send INR reminders to:       Comments:         Anticoagulation Care Providers     Provider Role Specialty Phone number    Cayetano Moore M.D. Referring Family Medicine [phone]    [facility] Responsible  [phone]        Anticoagulation Patient Findings   Pt is new to warfarin and new to [facility].  Discussed indication for warfarin therapy and INR goal range. Explained our services, hours of operation, warfarin therapy, potential SE, potential DI. Discussed diet at length, with an emphasis on foods rich in vitamin K.  Discussed monitoring parameters, such as blood in urine, blood in stool, discussed what to do if a dose is missed, or suspected as missed.  Emphasized importance of compliance including follow up. Discussed lifestyle choices of ETOH & smoking and its impact on therapy.    Pt has NOT signed new patient contract. Have copy available to be signed at next appointment.     Pt denies any unusual s/s of bleeding, bruising, clotting or any changes to diet or medications.    HPI:  Kaya Keita seen in clinic today, on anticoagulation therapy with warfarin for DVT.  Changes to current medical/health status since last appt: none  Denies signs/symptoms of bleeding and/or thrombosis since the last appt.    Denies any interval changes to diet  Denies any interval changes to medications since last appt.   Denies any  complications or cost restrictions with current therapy.     Patient recently seen by Dr. Moore where the decision was made to stop apixaban and start the patient on warfarin for DVT. She has no history of CVA.     A/P   INR is SUB-therapeutic.   Patient is transitioning from apixaban to warfarin and has only taken two days of 5 mg doses. INR expected to be low. Patient is to take 5 mg daily until Friday's follow up appointment and further dose adjustments can be made.    Follow up appointment in 4 days.    Estelle Gray, SonuD

## 2020-03-20 ENCOUNTER — ANTICOAGULATION VISIT (OUTPATIENT)
Dept: VASCULAR LAB | Facility: MEDICAL CENTER | Age: 55
End: 2020-03-20
Attending: FAMILY MEDICINE
Payer: COMMERCIAL

## 2020-03-20 DIAGNOSIS — I82.4Y9 DEEP VEIN THROMBOSIS (DVT) OF PROXIMAL LOWER EXTREMITY, UNSPECIFIED CHRONICITY, UNSPECIFIED LATERALITY (HCC): ICD-10-CM

## 2020-03-20 LAB
INR BLD: 1.5 (ref 0.9–1.2)
INR PPP: 1.5 (ref 2–3.5)

## 2020-03-20 PROCEDURE — 85610 PROTHROMBIN TIME: CPT

## 2020-03-20 PROCEDURE — 99212 OFFICE O/P EST SF 10 MIN: CPT

## 2020-03-20 NOTE — PROGRESS NOTES
"  Anticoagulation Summary  As of 3/20/2020    INR goal:   2.0-3.0   TTR:   0.0 % (1.1 wk)   INR used for dosin.50! (3/20/2020)   Warfarin maintenance plan:   7.5 mg (5 mg x 1.5) every Fri; 5 mg (5 mg x 1) all other days   Weekly warfarin total:   37.5 mg   Plan last modified:   Dahlia Fernandez, PharmD (3/20/2020)   Next INR check:   3/23/2020   Target end date:   Indefinite    Indications    Deep vein thrombosis (DVT) of proximal lower extremity  unspecified chronicity  unspecified laterality (HCC) [I82.4Y9]             Anticoagulation Episode Summary     INR check location:       Preferred lab:       Send INR reminders to:       Comments:         Anticoagulation Care Providers     Provider Role Specialty Phone number    Cayetano Moore M.D. Referring Family Medicine [phone]    [facility] Responsible  [phone]           Anticoagulation Patient Findings  Patient Findings     Positives:   Other complaints (c/o of bilateral leg numbness. legs feel \"tired and weak\")    Negatives:   Signs/symptoms of thrombosis, Signs/symptoms of bleeding, Laboratory test error suspected, Change in health, Change in alcohol use, Change in activity, Upcoming invasive procedure, Emergency department visit, Upcoming dental procedure, Missed doses, Extra doses, Change in medications, Change in diet/appetite, Hospital admission, Bruising          HPI:  Kaya Keita seen in clinic today, on anticoagulation therapy with warfarin for LLE DVT  Changes to current medical/health status since last appt: none  Denies signs/symptoms of bleeding and/or thrombosis since the last appt.    Denies any interval changes to diet  Denies any interval changes to medications since last appt.   Denies any complications or cost restrictions with current therapy.   Verified current warfarin dosing schedule.   BP check declined      A/P   INR  sub-therapeutic but trending up.  Increase weekly regimen  Utilized " translation services: Sukh ID #[id number]    Follow up appointment in 3 days.    Dahlia Fernandez, SonuD

## 2020-03-23 ENCOUNTER — ANTICOAGULATION VISIT (OUTPATIENT)
Dept: VASCULAR LAB | Facility: MEDICAL CENTER | Age: 55
End: 2020-03-23
Attending: FAMILY MEDICINE
Payer: COMMERCIAL

## 2020-03-23 DIAGNOSIS — I82.4Y9 DEEP VEIN THROMBOSIS (DVT) OF PROXIMAL LOWER EXTREMITY, UNSPECIFIED CHRONICITY, UNSPECIFIED LATERALITY (HCC): ICD-10-CM

## 2020-03-23 LAB
INR BLD: 1.9 (ref 0.9–1.2)
INR PPP: 1.9 (ref 2–3.5)

## 2020-03-23 PROCEDURE — 85610 PROTHROMBIN TIME: CPT

## 2020-03-23 PROCEDURE — 99213 OFFICE O/P EST LOW 20 MIN: CPT | Performed by: NURSE PRACTITIONER

## 2020-03-23 RX ORDER — WARFARIN SODIUM 5 MG/1
TABLET ORAL
Qty: 110 TAB | Refills: 1 | Status: SHIPPED | OUTPATIENT
Start: 2020-03-23 | End: 2020-07-13

## 2020-03-23 NOTE — PROGRESS NOTES
Anticoagulation Summary  As of 3/23/2020    INR goal:   2.0-3.0   TTR:   0.0 % (1.6 wk)   INR used for dosin.90! (3/23/2020)   Warfarin maintenance plan:   7.5 mg (5 mg x 1.5) every Wed; 5 mg (5 mg x 1) all other days   Weekly warfarin total:   37.5 mg   Plan last modified:   Halima Osorio AStacyPDEBBY (3/23/2020)   Next INR check:   3/27/2020   Target end date:   Indefinite    Indications    Deep vein thrombosis (DVT) of proximal lower extremity  unspecified chronicity  unspecified laterality (HCC) [I82.4Y9]             Anticoagulation Episode Summary     INR check location:       Preferred lab:       Send INR reminders to:       Comments:         Anticoagulation Care Providers     Provider Role Specialty Phone number    Cayetano Moore M.D. Referring Family Medicine [phone]    [facility] Responsible  [phone]        Anticoagulation Patient Findings      HPI:  Kaya Keita seen in clinic today for follow up on anticoagulation therapy in the presence of DVT hx.   services used to translate today's visit (Janusz #[id number]).   Denies any changes to current medical/health status since last appointment.   Denies any medication or diet changes.   No current symptoms of bleeding or thrombosis reported.    A/P:   INR slightly subtherapeutic.   Will have pt take 7.5 mg tonight then continue current regimen.   BP declined.  90 day refills requested by pt and sent to .    Follow up appointment on Friday.    Next Appointment: 2020 at 8:15 am.    Halima PALOMARES

## 2020-03-27 ENCOUNTER — ANTICOAGULATION VISIT (OUTPATIENT)
Dept: VASCULAR LAB | Facility: MEDICAL CENTER | Age: 55
End: 2020-03-27
Attending: FAMILY MEDICINE
Payer: COMMERCIAL

## 2020-03-27 DIAGNOSIS — I82.4Y9 DEEP VEIN THROMBOSIS (DVT) OF PROXIMAL LOWER EXTREMITY, UNSPECIFIED CHRONICITY, UNSPECIFIED LATERALITY (HCC): ICD-10-CM

## 2020-03-27 LAB
INR BLD: 2 (ref 0.9–1.2)
INR PPP: 2 (ref 2–3.5)

## 2020-03-27 PROCEDURE — 99211 OFF/OP EST MAY X REQ PHY/QHP: CPT

## 2020-03-27 PROCEDURE — 85610 PROTHROMBIN TIME: CPT

## 2020-03-27 NOTE — PROGRESS NOTES
Anticoagulation Summary  As of 3/27/2020    INR goal:   2.0-3.0   TTR:   0.0 % (2.1 wk)   INR used for dosin.00 (3/27/2020)   Warfarin maintenance plan:   7.5 mg (5 mg x 1.5) every Mon, Fri; 5 mg (5 mg x 1) all other days   Weekly warfarin total:   40 mg   Plan last modified:   David Kelly PharmD (3/27/2020)   Next INR check:   2020   Target end date:   Indefinite    Indications    Deep vein thrombosis (DVT) of proximal lower extremity  unspecified chronicity  unspecified laterality (HCC) [I82.4Y9]             Anticoagulation Episode Summary     INR check location:       Preferred lab:       Send INR reminders to:       Comments:         Anticoagulation Care Providers     Provider Role Specialty Phone number    Cayetano Moore M.D. Referring Family Medicine [phone]    [facility] Responsible  [phone]        Anticoagulation Patient Findings      HPI:  Kaya Keita seen in clinic today for follow up on anticoagulation therapy in the presence of DVT.   Denies any changes to current medical/health status since last appointment.   Denies any medication or diet changes.   No current symptoms of bleeding or thrombosis reported.    A/P:   INR is therapeutic at 2.0.   Patient to begin increased weekly regimen     Follow up appointment in 5 days    Next Appointment: 2020    Sonu [email or web address].D    james #[id number]- translation

## 2020-04-01 ENCOUNTER — ANTICOAGULATION VISIT (OUTPATIENT)
Dept: VASCULAR LAB | Facility: MEDICAL CENTER | Age: 55
End: 2020-04-01
Attending: INTERNAL MEDICINE
Payer: COMMERCIAL

## 2020-04-01 DIAGNOSIS — I82.4Y9: ICD-10-CM

## 2020-04-01 LAB — INR PPP: 2.7 (ref 2–3.5)

## 2020-04-01 PROCEDURE — 85610 PROTHROMBIN TIME: CPT

## 2020-04-01 PROCEDURE — 99212 OFFICE O/P EST SF 10 MIN: CPT | Performed by: NURSE PRACTITIONER

## 2020-04-01 NOTE — PROGRESS NOTES
Anticoagulation Summary  As of 2020    INR goal:   2.0-3.0   TTR:   25.0 % (2.9 wk)   INR used for dosin.70 (2020)   Warfarin maintenance plan:   7.5 mg (5 mg x 1.5) every Mon, Fri; 5 mg (5 mg x 1) all other days   Weekly warfarin total:   40 mg   Plan last modified:   David Kelly, PharmD (3/27/2020)   Next INR check:      Target end date:   Indefinite    Indications    Deep vein thrombosis (DVT) of proximal lower extremity  unspecified chronicity  unspecified laterality (HCC) [I82.4Y9]             Anticoagulation Episode Summary     INR check location:       Preferred lab:       Send INR reminders to:       Comments:         Anticoagulation Care Providers     Provider Role Specialty Phone number    Cayetano Moore M.D. Referring Family Medicine [phone]    [facility] Responsible  [phone]        Anticoagulation Patient Findings      HPI:  Kaya Keita seen in clinic today for follow up on anticoagulation therapy in the presence of DVT hx.  MARICHUY Finney MA translated for our visit today.   Denies any changes to current medical/health status since last appointment.   She is currently avoiding all greens but wants to add in 1-2 servings a week and will stay consistent. Denies any medication changes.   No current symptoms of bleeding or thrombosis reported.  She saw a vascular specialist earlier this week. Doesn't remember the name. She will go home and call Tanish back so we can obtain those records.    A/P:   INR therapeutic. INR trended up a bit from 2.0 on Friday.   Continue current regimen for now and she will incorporate 2 servings of greens into her diet per week. We discussed the importance of staying consistent with her vit k intake. She verbalizes understanding.   BP recorded in vitals.    Follow up appointment in 2 week(s) per pt's request with current pandemic. She will monitor CLOSELY for bleeding or any leg swelling or pain and keep her diet  consistent.    Next Appointment: Thursday, April 16, 2020 at 8:15 am.    Halima PALOMARES

## 2020-04-02 LAB — INR BLD: 2.7 (ref 0.9–1.2)

## 2020-04-06 ENCOUNTER — TELEPHONE (OUTPATIENT)
Dept: VASCULAR LAB | Facility: MEDICAL CENTER | Age: 55
End: 2020-04-06

## 2020-04-06 NOTE — TELEPHONE ENCOUNTER
Reached out to Dr Scott Rice? (vascular surgery) at [phone] to request most recent progress note. Left message with our fax number.

## 2020-04-15 ENCOUNTER — TELEPHONE (OUTPATIENT)
Dept: VASCULAR LAB | Facility: MEDICAL CENTER | Age: 55
End: 2020-04-15

## 2020-04-15 ENCOUNTER — ANTICOAGULATION MONITORING (OUTPATIENT)
Dept: VASCULAR LAB | Facility: MEDICAL CENTER | Age: 55
End: 2020-04-15

## 2020-04-15 NOTE — TELEPHONE ENCOUNTER
S/with pt. She was following up on whether she was ok or not to stop warfarin per Dr Broussard's orders. Had Jorge look at Dr's progress note, and was given the ok to stop warfarin.   Cancelled pt's coumadin appt, and will be d/c from [facility].

## 2020-04-15 NOTE — PROGRESS NOTES
Per Dr. Broussard @ [facility], patient to discontinue anticoagulation at this time (progress note scanned to media).  Patient has appropriate follow up with their office in place.  Pending further contact, will discharge from anticoagulation clinic.  Jorge Galvan, PharmD, BCACP    CC Dr Michael Bloch

## 2020-04-16 ENCOUNTER — APPOINTMENT (OUTPATIENT)
Dept: VASCULAR LAB | Facility: MEDICAL CENTER | Age: 55
End: 2020-04-16
Attending: INTERNAL MEDICINE
Payer: COMMERCIAL

## 2020-06-18 ENCOUNTER — APPOINTMENT (OUTPATIENT)
Dept: VASCULAR LAB | Facility: MEDICAL CENTER | Age: 55
End: 2020-06-18
Attending: FAMILY MEDICINE
Payer: COMMERCIAL

## 2020-06-30 ENCOUNTER — HOSPITAL ENCOUNTER (OUTPATIENT)
Dept: RADIOLOGY | Facility: MEDICAL CENTER | Age: 55
End: 2020-06-30
Attending: FAMILY MEDICINE
Payer: COMMERCIAL

## 2020-06-30 ENCOUNTER — HOSPITAL ENCOUNTER (OUTPATIENT)
Dept: LAB | Facility: MEDICAL CENTER | Age: 55
End: 2020-06-30
Attending: FAMILY MEDICINE
Payer: COMMERCIAL

## 2020-06-30 DIAGNOSIS — Z86.718 HISTORY OF DVT (DEEP VEIN THROMBOSIS): ICD-10-CM

## 2020-06-30 DIAGNOSIS — M79.605 PAIN IN BOTH LOWER EXTREMITIES: ICD-10-CM

## 2020-06-30 DIAGNOSIS — M79.604 PAIN IN BOTH LOWER EXTREMITIES: ICD-10-CM

## 2020-06-30 LAB
CK SERPL-CCNC: 51 U/L (ref 0–154)
CRP SERPL HS-MCNC: 3.5 MG/L (ref 0–7.5)
D DIMER PPP IA.FEU-MCNC: 0.33 UG/ML (FEU) (ref 0–0.5)

## 2020-06-30 PROCEDURE — 36415 COLL VENOUS BLD VENIPUNCTURE: CPT

## 2020-06-30 PROCEDURE — 86141 C-REACTIVE PROTEIN HS: CPT

## 2020-06-30 PROCEDURE — 93922 UPR/L XTREMITY ART 2 LEVELS: CPT | Mod: 26 | Performed by: INTERNAL MEDICINE

## 2020-06-30 PROCEDURE — 93922 UPR/L XTREMITY ART 2 LEVELS: CPT

## 2020-06-30 PROCEDURE — 82550 ASSAY OF CK (CPK): CPT

## 2020-06-30 PROCEDURE — 85379 FIBRIN DEGRADATION QUANT: CPT

## 2020-07-13 ENCOUNTER — OFFICE VISIT (OUTPATIENT)
Dept: VASCULAR LAB | Facility: MEDICAL CENTER | Age: 55
End: 2020-07-13
Attending: FAMILY MEDICINE
Payer: COMMERCIAL

## 2020-07-13 DIAGNOSIS — E78.49 OTHER HYPERLIPIDEMIA: ICD-10-CM

## 2020-07-13 DIAGNOSIS — Z79.4 LONG TERM (CURRENT) USE OF INSULIN (HCC): ICD-10-CM

## 2020-07-13 DIAGNOSIS — I83.812 VARICOSE VEINS OF LEFT LOWER EXTREMITY WITH PAIN: ICD-10-CM

## 2020-07-13 DIAGNOSIS — I87.2 CHRONIC VENOUS INSUFFICIENCY: ICD-10-CM

## 2020-07-13 DIAGNOSIS — E11.65 UNCONTROLLED TYPE 2 DIABETES MELLITUS WITH HYPERGLYCEMIA (HCC): ICD-10-CM

## 2020-07-13 DIAGNOSIS — Z86.718 HISTORY OF DVT (DEEP VEIN THROMBOSIS): ICD-10-CM

## 2020-07-13 PROCEDURE — 99213 OFFICE O/P EST LOW 20 MIN: CPT | Mod: 95,CR | Performed by: FAMILY MEDICINE

## 2020-07-13 RX ORDER — ASPIRIN 81 MG/1
81 TABLET ORAL DAILY
Qty: 30 TAB | COMMUNITY

## 2020-07-13 ASSESSMENT — ENCOUNTER SYMPTOMS
DIZZINESS: 0
DIARRHEA: 0
NAUSEA: 0
BRUISES/BLEEDS EASILY: 0
FEVER: 0
HEMOPTYSIS: 0
ORTHOPNEA: 0
SORE THROAT: 0
COUGH: 0
PALPITATIONS: 0
NERVOUS/ANXIOUS: 0
MYALGIAS: 1
WEAKNESS: 0
SHORTNESS OF BREATH: 0
FOCAL WEAKNESS: 0
SEIZURES: 0
BLURRED VISION: 0
BLOOD IN STOOL: 0
CHILLS: 0
HEADACHES: 0
INSOMNIA: 0
VOMITING: 0
DOUBLE VISION: 0
ABDOMINAL PAIN: 0
DEPRESSION: 0
WHEEZING: 0
TREMORS: 0

## 2020-07-13 NOTE — PROGRESS NOTES
FOLLOW-UP VASCULAR VISIT  Subjective:   Kaya Keita is a 54 y.o. female who presents today 7/13/20 for   Chief Complaint   Patient presents with   • Follow-Up     hx of DVT, CVI   Referred for LOT determination of anticoagulation in context of acute venothromboembolic disease    HPI:  Visit completed via Facetime due to restrictions of COVID-19 pandemic.  All issues as below were discussed and addressed by no physical exam was performed unless allowed by visual confirmation on Facetime.  If it was felt that patient should be evaluated in the clinic, there were directed there.  Patient verbally consented to Facetime tele-video visit.     VTE disease / Anticoagulation:   Current symptoms:  Only occ LLE heaviness and swelling, mostly end of the day.  Is using thigh-high compression with good results.  No other new sx.      Denies current SOB, CP, leg cyanosis of digits, redness of extremities.  Anticoagulant: had been on VKA, then stopped after visit with Dr. Broussard.  Currently on ASA  Complications: none, tolerating well, no bleeding or bruising   Date of initiation of anticoagulation: around 4/2019   Adherence: reports complete, no missed doses      Pertinent VTE pmhx:   Date of Diagnosis: around3/2019  Type of Venous thromboembolic disease (VTE): acute BLE DVT   Type of imaging: duplex in [place]  Preceding/presenting symptoms: severe bilat leg pains  VTE tx course: dx in [place], started on eliquis 5mg once daily in [place], then increased to 5mg BID once in .  Seen by clinical pharm on 11/27 and referred for eval due to expense of eliquis and pain issues.   Any personal VTE hx? Yes, Details: one additional prior unprovoked DVT , 6mo prior to 3/2019, used Eliquis for 2mo only  Any family VTE hx? No    UNPROVOKED VS PROVOKED:   Recent surgery ? No  Recent trauma ? No  Smoker? No  Extended travel? No  Other periods of immobility? No  Other risk factors for VTE disease:  none  WOMEN: Colorectal CA  screening: no       Cervical CA screening: no       Breast CA screening: no       Any estrogen, testosterone HRT, E2 birth control, tamoxifene, raloxifene: no       Hx of recurrent miscarriages: no  Hypercoaguability work-up completed?  NO    T2D:  Stable. No current symptoms reported.   Tolerating meds and no recent med changes.   Home blood sugars stable, reports no lows.   Lab Results   Component Value Date    HBA1C 10.1 (H) 02/19/2020     Hyperlipidemia:    Stable, no current concerns  Current treatment: atorva 20mg   Myalgias? no  Other adverse drug reactions? no  Lipid profile:  As noted below     Outpatient Encounter Medications as of 7/13/2020   Medication Sig Dispense Refill   • aspirin 81 MG EC tablet Take 1 Tab by mouth every day. 30 Tab    • [DISCONTINUED] warfarin (COUMADIN) 5 MG Tab Take 1-1.5 tabs by mouth daily or as directed by anticoagulation clinic. 110 Tab 1   • TRUEPLUS PEN NEEDLES 31G X 6 MM Misc USE FOR INJECTING INSULIN ONE TIME PER DAY  1   • atorvastatin (LIPITOR) 20 MG Tab atorvastatin 20 mg tablet   Take 1 tablet every day by oral route for 90 days.     • lisinopril (PRINIVIL) 2.5 MG Tab lisinopril 2.5 mg tablet   Take 1 tablet every day by oral route for 90 days.     • metformin (GLUCOPHAGE) 1000 MG tablet metformin 1,000 mg tablet   Take 1 tablet twice a day by oral route.     • insulin detemir (LEVEMIR FLEXTOUCH) 100 UNIT/ML injection PEN Inject 20 Units as instructed 2 times a day.       No facility-administered encounter medications on file as of 7/13/2020.      No Known Allergies    Social History     Tobacco Use   • Smoking status: Never Smoker   • Smokeless tobacco: Never Used   Substance Use Topics   • Alcohol use: Not on file   • Drug use: Not on file       DIET AND EXERCISE:  Weight Change:stable   BMI Readings from Last 4 Encounters:   03/12/20 37.98 kg/m²   12/05/19 36.66 kg/m²      Diet: common adult  Exercise: minimal exercise     Review of Systems   Constitutional:  Negative for chills, fever and malaise/fatigue.   HENT: Negative for nosebleeds, sore throat and tinnitus.    Eyes: Negative for blurred vision and double vision.   Respiratory: Negative for cough, hemoptysis, shortness of breath and wheezing.    Cardiovascular: Negative for chest pain, palpitations, orthopnea and leg swelling.   Gastrointestinal: Negative for abdominal pain, blood in stool, diarrhea, melena, nausea and vomiting.   Genitourinary: Negative for hematuria.   Musculoskeletal: Positive for joint pain and myalgias.   Skin: Negative for itching and rash.   Neurological: Negative for dizziness, tremors, focal weakness, seizures, weakness and headaches.   Endo/Heme/Allergies: Does not bruise/bleed easily.   Psychiatric/Behavioral: Negative for depression. The patient is not nervous/anxious and does not have insomnia.       Objective:     There were no vitals filed for this visit.      BP Readings from Last 5 Encounters:   03/12/20 117/58   12/05/19 112/44      There is no height or weight on file to calculate BMI.  Physical Exam  Constitutional:       Appearance: Normal appearance.   HENT:      Head: Normocephalic.   Eyes:      Extraocular Movements: Extraocular movements intact.      Conjunctiva/sclera: Conjunctivae normal.   Neck:      Musculoskeletal: Normal range of motion.   Pulmonary:      Effort: Pulmonary effort is normal.   Skin:     General: Skin is dry.      Coloration: Skin is not pale.      Findings: No rash.   Neurological:      General: No focal deficit present.      Mental Status: She is alert and oriented to person, place, and time.   Psychiatric:         Mood and Affect: Mood normal.         Behavior: Behavior normal.       Lab Results   Component Value Date    CHOLSTRLTOT 163 02/19/2020    LDL 89 02/19/2020    HDL 54 02/19/2020    TRIGLYCERIDE 99 02/19/2020      Lab Results   Component Value Date    INR 2.70 04/01/2020       Lab Results   Component Value Date    HBA1C 10.1 (H) 02/19/2020       Lab Results   Component Value Date    SODIUM 137 11/11/2019    POTASSIUM 4.1 11/11/2019    CHLORIDE 103 11/11/2019    CO2 29 11/11/2019    GLUCOSE 123 (H) 11/11/2019    BUN 13 11/11/2019    CREATININE 0.56 11/11/2019    IFAFRICA >60 11/11/2019    IFNOTAFR >60 11/11/2019        Lab Results   Component Value Date    WBC 5.5 11/11/2019    RBC 4.67 11/11/2019    HEMOGLOBIN 12.9 11/11/2019    HEMATOCRIT 39.3 11/11/2019    MCV 84.2 11/11/2019    MCH 27.6 11/11/2019    MCHC 32.8 (L) 11/11/2019    MPV 11.7 11/11/2019         Ref. Range 6/30/2020 09:35   C Reactive Protein High Sensitive Latest Ref Range: 0.0 - 7.5 mg/L 3.5   CPK Total Latest Ref Range: 0 - 154 U/L 51        Ref. Range 6/30/2020 09:35   D-Dimer Screen Latest Ref Range: 0.00 - 0.50 ug/mL (FEU) 0.33     VASCULAR IMAGING:     Last EKG: No results found for this or any previous visit.     SHANNON 6/30/20   No prior study is available for comparison.    No evidence of significant PAD.    Medical Decision Making:  Today's Assessment / Status / Plan:     1. Chronic venous insufficiency     2. History of DVT (deep vein thrombosis)     3. Other hyperlipidemia     4. Uncontrolled type 2 diabetes mellitus with hyperglycemia (HCC)     5. Long term (current) use of insulin (HCC)     6. Varicose veins of left lower extremity with pain        Patient Type: Primary Prevention and DM    Etiology of Established CVD if Present:   1) acute DVT     Anticoagulation:  Indication for anticoagulation: recurrent acute DVT   Anti-Platelet/Anti-Coagulant Tx: yes  HAS-BLED bleeding risk calc ([email or web address]): 0 pts, 0.9% annual risk   Appears to have unprovoked, isolated acute DVT (presumed RLE, but unclear based upon hx from pt if is both).   Initial event occurred 9/2018 and did not receive appropriate intensity or LOT to achieve full treatment, so consider untreated/partially treated and then worsening sx that we will consider to be ongoing acute on chronic DVT and not 2 isolated  events.    Due to unprovoked, does have 26% chance of recurrence over 5yrs, so we will consider further risk eval with repeat D-dimer and reserve possible eval for thrombophilia testing as optional.  High risk of recurrence with low risk of bleed favors indefinite therapy.    We had reviewed for indefinite therapy, but vasc surg (Dr. Broussard) has recommended to d/c VKA at her last visit with him, so we will defer to his decision-making   Repeat D-dimer, CRP negative   Anti-Coagulation Plan:  - no OAC at this time,    - counseled on signs and symptoms of acute VTE that require seeking prompt attention in the ED to include shortness of breath, chest pain, pain with deep inhalation, acute leg swelling and/or pain in calf or leg   - elevate legs as much as possible, use compression stockings/socks if directed by your provider  - Avoid hormonal therapies including estrogen or testosterone-containing meds, or raloxifene or tamoxifene (commonly used for osteoporosis)  - Avoid sedentary periods  - continue complete avoidance of tobacco products  - if having any invasive procedure,please make sure the doctor knows of your history of blood clots and current anticoagulation status    Lipid Management: Qualifies for Statin Therapy Based on 2018 ACC/AHA Guidelines: yes  Calculated 10-Year Risk of ASCVD: N/A  Currently on Statin: yes   NLA Risk Category: High:  T2D and 0-1 RF and no evidence of end-org damage  Tx goal:  non-HDL <130, LDL-C <100 (optional: apoB<90)   At goal:  YES  Plan:  - reinforced ongoing TLC measures   - continue atorva 20mg, consider increase to 40-80mg to hit target prn   - defer mgmt and monitoring to PCP     Blood Pressure Management:Goal: ACC/AHA (2017) goal <130/80   Home BP at goal:  yes   Office BP at goal:  yes   Echo: N/A   ACR: not available    Device candidate? no  Plan:   Monitoring:   - start/continue home BP monitoring, reviewed correct technique, provide BP log and instructions  - order 24h  ABPM:  NO  - monitor lytes/gfr routinely   - contact office if BP consistently >140/>90 to discussion of tx adjustments   - defer ongoing care to PCP   Medications:  ACEi/ARB: continue lisinopril 2.5mg daily, titrate as needed for BP goals in the future   DHP-CCB: none   Thiazide: none  Other: none     Glycemic Status: Diabetic, T2, insulin-requiring    Lab Results   Component Value Date    HBA1C 10.1 (H) 02/19/2020   Goal A1c < 7.0  ACR: not examined  Plan:  - defer mgmt to PCP   - continue metformin 1000mg BID  - continue levermir flextouch 20mg BID, adjust per PCP  - consider addition of SGLT2i vs weekly GLP1RA as additional options   - recommmend for routine care with PCP (or endocrine) to include regular A1c monitoring, annual albumin/creatinine ratio (ACR), annual diabetic retinopathy screening, foot exams, annual flu vaccine, and updates to pneumonia vaccines as appropriate     Smoking:  continued complete avoidance of all tobacco products     Physical Activity: continue healthy activity to improve CV fitness, see care instructions for additional details     Weight Management and Nutrition: Dietary plan was discussed with patient at this visit including DASH, low sodium and/or as outlined in care instructions     Other:   1) leg pain.  Ddx: post-DVT syndrome, spasms, cramping, plantar fasciitis.    Based upon exam does not appear to be DVT-related.    DM and insulin-induced swelling are contributors   D-dimer, CPK, CRP all normal.   SHANNON normal   Seen by [facility] med (Dr. Broussard) in 3/2020 and informed likely CVI-related, continue compressions   - recommend tylenol, compression, topical analgesics, magnesium 500mg OTC  - f/u with PCP for eval for possible plantar fasc issues     2) venous reflux disease. Med mgmt only per surgery.    - continue knee-high compression socks, 20-30mmHg, as much as tolerated    - increase walking, avoid prolonged standing   - elevated legs while sitting and sleeping above heart  "level  - reduce sodium (\"salt\") in diet to less than 2,000mg daily   - continue daily moisturizing lotion (such as Gold Bond Diabetic Foot Cream)  - start trial of horse chestnut seed extract 300mg BID for 3-6mo     Instructed to follow-up with PCP for remainder of adult medical needs: yes  We will partner with other providers in the management of established vascular disease and cardiometabolic risk factors.    Studies to Be Obtained: none  Labs to Be Obtained: none     Follow up in: vasc med prn, defer ongoing care to PCP    "

## 2021-08-12 ENCOUNTER — HOSPITAL ENCOUNTER (OUTPATIENT)
Dept: LAB | Facility: MEDICAL CENTER | Age: 56
End: 2021-08-12
Attending: NURSE PRACTITIONER
Payer: COMMERCIAL

## 2021-08-12 LAB
ALBUMIN SERPL BCP-MCNC: 4.1 G/DL (ref 3.2–4.9)
ALBUMIN/GLOB SERPL: 1.1 G/DL
ALP SERPL-CCNC: 87 U/L (ref 30–99)
ALT SERPL-CCNC: 22 U/L (ref 2–50)
ANION GAP SERPL CALC-SCNC: 12 MMOL/L (ref 7–16)
AST SERPL-CCNC: 19 U/L (ref 12–45)
BILIRUB SERPL-MCNC: 0.4 MG/DL (ref 0.1–1.5)
BUN SERPL-MCNC: 19 MG/DL (ref 8–22)
CALCIUM SERPL-MCNC: 9.2 MG/DL (ref 8.5–10.5)
CHLORIDE SERPL-SCNC: 100 MMOL/L (ref 96–112)
CHOLEST SERPL-MCNC: 216 MG/DL (ref 100–199)
CO2 SERPL-SCNC: 26 MMOL/L (ref 20–33)
CREAT SERPL-MCNC: 0.58 MG/DL (ref 0.5–1.4)
GLOBULIN SER CALC-MCNC: 3.6 G/DL (ref 1.9–3.5)
GLUCOSE SERPL-MCNC: 129 MG/DL (ref 65–99)
HDLC SERPL-MCNC: 64 MG/DL
LDLC SERPL CALC-MCNC: 133 MG/DL
POTASSIUM SERPL-SCNC: 4.3 MMOL/L (ref 3.6–5.5)
PROT SERPL-MCNC: 7.7 G/DL (ref 6–8.2)
SODIUM SERPL-SCNC: 138 MMOL/L (ref 135–145)
TRIGL SERPL-MCNC: 94 MG/DL (ref 0–149)
TSH SERPL DL<=0.005 MIU/L-ACNC: 2.79 UIU/ML (ref 0.38–5.33)

## 2021-08-12 PROCEDURE — 80053 COMPREHEN METABOLIC PANEL: CPT

## 2021-08-12 PROCEDURE — 36415 COLL VENOUS BLD VENIPUNCTURE: CPT

## 2021-08-12 PROCEDURE — 80061 LIPID PANEL: CPT

## 2021-08-12 PROCEDURE — 84443 ASSAY THYROID STIM HORMONE: CPT

## 2021-09-02 ENCOUNTER — PRE-ADMISSION TESTING (OUTPATIENT)
Dept: ADMISSIONS | Facility: MEDICAL CENTER | Age: 56
End: 2021-09-02
Attending: OBSTETRICS & GYNECOLOGY
Payer: COMMERCIAL

## 2021-09-02 DIAGNOSIS — Z01.812 PRE-OPERATIVE LABORATORY EXAMINATION: ICD-10-CM

## 2021-09-02 LAB
ANION GAP SERPL CALC-SCNC: 12 MMOL/L (ref 7–16)
APPEARANCE UR: CLEAR
BACTERIA #/AREA URNS HPF: ABNORMAL /HPF
BASOPHILS # BLD AUTO: 0.4 % (ref 0–1.8)
BASOPHILS # BLD: 0.03 K/UL (ref 0–0.12)
BILIRUB UR QL STRIP.AUTO: NEGATIVE
BUN SERPL-MCNC: 14 MG/DL (ref 8–22)
CALCIUM SERPL-MCNC: 9.1 MG/DL (ref 8.5–10.5)
CHLORIDE SERPL-SCNC: 101 MMOL/L (ref 96–112)
CO2 SERPL-SCNC: 28 MMOL/L (ref 20–33)
COLOR UR: YELLOW
CREAT SERPL-MCNC: <0.17 MG/DL (ref 0.5–1.4)
EOSINOPHIL # BLD AUTO: 0.1 K/UL (ref 0–0.51)
EOSINOPHIL NFR BLD: 1.4 % (ref 0–6.9)
EPI CELLS #/AREA URNS HPF: NEGATIVE /HPF
ERYTHROCYTE [DISTWIDTH] IN BLOOD BY AUTOMATED COUNT: 45 FL (ref 35.9–50)
GLUCOSE SERPL-MCNC: 95 MG/DL (ref 65–99)
GLUCOSE UR STRIP.AUTO-MCNC: NEGATIVE MG/DL
HCT VFR BLD AUTO: 40.3 % (ref 37–47)
HGB BLD-MCNC: 12.9 G/DL (ref 12–16)
HYALINE CASTS #/AREA URNS LPF: ABNORMAL /LPF
IMM GRANULOCYTES # BLD AUTO: 0.03 K/UL (ref 0–0.11)
IMM GRANULOCYTES NFR BLD AUTO: 0.4 % (ref 0–0.9)
KETONES UR STRIP.AUTO-MCNC: NEGATIVE MG/DL
LEUKOCYTE ESTERASE UR QL STRIP.AUTO: ABNORMAL
LYMPHOCYTES # BLD AUTO: 2.07 K/UL (ref 1–4.8)
LYMPHOCYTES NFR BLD: 29.4 % (ref 22–41)
MCH RBC QN AUTO: 26.9 PG (ref 27–33)
MCHC RBC AUTO-ENTMCNC: 32 G/DL (ref 33.6–35)
MCV RBC AUTO: 84.1 FL (ref 81.4–97.8)
MICRO URNS: ABNORMAL
MONOCYTES # BLD AUTO: 0.46 K/UL (ref 0–0.85)
MONOCYTES NFR BLD AUTO: 6.5 % (ref 0–13.4)
NEUTROPHILS # BLD AUTO: 4.36 K/UL (ref 2–7.15)
NEUTROPHILS NFR BLD: 61.9 % (ref 44–72)
NITRITE UR QL STRIP.AUTO: NEGATIVE
NRBC # BLD AUTO: 0 K/UL
NRBC BLD-RTO: 0 /100 WBC
PH UR STRIP.AUTO: 6.5 [PH] (ref 5–8)
PLATELET # BLD AUTO: 264 K/UL (ref 164–446)
PMV BLD AUTO: 11.8 FL (ref 9–12.9)
POTASSIUM SERPL-SCNC: 4.4 MMOL/L (ref 3.6–5.5)
PROT UR QL STRIP: NEGATIVE MG/DL
RBC # BLD AUTO: 4.79 M/UL (ref 4.2–5.4)
RBC # URNS HPF: ABNORMAL /HPF
RBC UR QL AUTO: NEGATIVE
SODIUM SERPL-SCNC: 141 MMOL/L (ref 135–145)
SP GR UR STRIP.AUTO: 1.01
UROBILINOGEN UR STRIP.AUTO-MCNC: 0.2 MG/DL
WBC # BLD AUTO: 7.1 K/UL (ref 4.8–10.8)
WBC #/AREA URNS HPF: ABNORMAL /HPF

## 2021-09-02 PROCEDURE — 80048 BASIC METABOLIC PNL TOTAL CA: CPT

## 2021-09-02 PROCEDURE — 81001 URINALYSIS AUTO W/SCOPE: CPT

## 2021-09-02 PROCEDURE — 83036 HEMOGLOBIN GLYCOSYLATED A1C: CPT

## 2021-09-02 PROCEDURE — 36415 COLL VENOUS BLD VENIPUNCTURE: CPT

## 2021-09-02 PROCEDURE — 85025 COMPLETE CBC W/AUTO DIFF WBC: CPT

## 2021-09-02 ASSESSMENT — FIBROSIS 4 INDEX: FIB4 SCORE: 0.69

## 2021-09-02 NOTE — H&P
DATE OF ADMISSION:  09/15/2021     HISTORY OF PRESENT ILLNESS:  The patient is a 55-year-old  4, para 4,   postmenopausal for the past 2 years, has a retained ParaGard IUD with failed   removal in the office, referred here for further management.     PAST MEDICAL HISTORY:  The patient is diabetic and takes insulin 46 units at   bedtime and also has high blood pressure, takes lisinopril for it.     OBSTETRIC HISTORY:   4, para 4, 4 normal vaginal deliveries.     SOCIAL HISTORY:  Denies smoking or alcohol use.     SURGICAL HISTORY:  Nothing contributory.     REVIEW OF SYSTEMS:  The patient is alert and oriented.  Denies shortness of   breath, chest pain or palpitation.  Regular bowel and bladder habits.     PHYSICAL EXAMINATION:    VITAL SIGNS:  Stable.  See EMR for current vitals.  CONSTITUTIONAL:  The patient is awake, alert, well developed, well nourished,   well groomed.  RESPIRATORY:  The patient is relaxed, breathes without effort.  LUNGS:  Clear to auscultate and expand symmetrically.  CARDIOVASCULAR:   Rate is normal, rhythm is regular.  S1, S2 normal.  No   murmurs, no gallops or rubs.  GASTROINTESTINAL:  Abdomen soft, nontender, no guarding or rigidity.  Bowel   sounds are normal.  No palpable masses, no hepatosplenomegaly.  PELVIC:  Deferred.     IMPRESSION AND PLAN:  A 55-year-old  4, para 4 with four normal vaginal   deliveries, postmenopausal for the past 2 years with a retained IUD with   failed removal in the office in spite of mechanical dilation of cervix,   scheduled for hysteroscopy, removal of IUD and dilatation and curettage of the   endometrial lining.  Preoperative instructions were given.  Operative   procedure discussed in detail.  Instructed to cut down her insulin to half the   dose and to stay fasting after midnight.  All questions answered to   satisfaction.  Risks inclusive of but not limited to infection, injury,   bleeding were discussed.  The risk of perforation  was explained, in which case   may be observed overnight.  Otherwise, if everything goes without   complications, she will be discharged after a couple of hours of observation   in the recovery room.        ______________________________  MD LEONARDO Waters/ANTONIO/JOSE    DD:  09/01/2021 15:22  DT:  09/01/2021 16:00    Job#:  [id number]

## 2021-09-03 LAB
EST. AVERAGE GLUCOSE BLD GHB EST-MCNC: 166 MG/DL
HBA1C MFR BLD: 7.4 % (ref 4–5.6)

## 2021-09-14 NOTE — OR NURSING
COVID-19 Pre-surgery screenin. Do you have an undiagnosed respiratory illness or symptoms such as coughing or sneezing? NO (Yes/No)  a. Onset of Sx   b. Acute vs. chronic respiratory illness      2. Do you have an unexplained fever greater than 100.4 degrees Fahrenheit or 38 degrees Celsius?                     NO (Yes/No)     3. Have you had direct exposure to a patient who tested positive for Covid-19?                          NO (Yes/No)     4. Have you had any loss of your sense of taste or smell? Have you had N/V or sore throat? NO     Patient has been informed of visitor policy and asked to wear a mask upon entering the hospital   YES (Yes/No)

## 2021-09-15 ENCOUNTER — ANESTHESIA (OUTPATIENT)
Dept: SURGERY | Facility: MEDICAL CENTER | Age: 56
End: 2021-09-15
Payer: COMMERCIAL

## 2021-09-15 ENCOUNTER — HOSPITAL ENCOUNTER (OUTPATIENT)
Facility: MEDICAL CENTER | Age: 56
End: 2021-09-15
Attending: OBSTETRICS & GYNECOLOGY | Admitting: OBSTETRICS & GYNECOLOGY
Payer: COMMERCIAL

## 2021-09-15 ENCOUNTER — ANESTHESIA EVENT (OUTPATIENT)
Dept: SURGERY | Facility: MEDICAL CENTER | Age: 56
End: 2021-09-15
Payer: COMMERCIAL

## 2021-09-15 VITALS
DIASTOLIC BLOOD PRESSURE: 60 MMHG | OXYGEN SATURATION: 93 % | RESPIRATION RATE: 16 BRPM | HEIGHT: 60 IN | WEIGHT: 202.16 LBS | SYSTOLIC BLOOD PRESSURE: 116 MMHG | TEMPERATURE: 97.2 F | BODY MASS INDEX: 39.69 KG/M2 | HEART RATE: 77 BPM

## 2021-09-15 DIAGNOSIS — Z01.812 PRE-OPERATIVE LABORATORY EXAMINATION: ICD-10-CM

## 2021-09-15 LAB
EXTERNAL QUALITY CONTROL: NORMAL
GLUCOSE BLD-MCNC: 132 MG/DL (ref 65–99)
PATHOLOGY CONSULT NOTE: NORMAL
SARS-COV+SARS-COV-2 AG RESP QL IA.RAPID: NEGATIVE

## 2021-09-15 PROCEDURE — 502587 HCHG PACK, D&C: Performed by: OBSTETRICS & GYNECOLOGY

## 2021-09-15 PROCEDURE — 700101 HCHG RX REV CODE 250: Performed by: ANESTHESIOLOGY

## 2021-09-15 PROCEDURE — 700111 HCHG RX REV CODE 636 W/ 250 OVERRIDE (IP): Performed by: ANESTHESIOLOGY

## 2021-09-15 PROCEDURE — A9270 NON-COVERED ITEM OR SERVICE: HCPCS | Performed by: ANESTHESIOLOGY

## 2021-09-15 PROCEDURE — 82962 GLUCOSE BLOOD TEST: CPT

## 2021-09-15 PROCEDURE — 160009 HCHG ANES TIME/MIN: Performed by: OBSTETRICS & GYNECOLOGY

## 2021-09-15 PROCEDURE — 700101 HCHG RX REV CODE 250: Performed by: OBSTETRICS & GYNECOLOGY

## 2021-09-15 PROCEDURE — 160025 RECOVERY II MINUTES (STATS): Performed by: OBSTETRICS & GYNECOLOGY

## 2021-09-15 PROCEDURE — 88300 SURGICAL PATH GROSS: CPT

## 2021-09-15 PROCEDURE — 160041 HCHG SURGERY MINUTES - EA ADDL 1 MIN LEVEL 4: Performed by: OBSTETRICS & GYNECOLOGY

## 2021-09-15 PROCEDURE — 160046 HCHG PACU - 1ST 60 MINS PHASE II: Performed by: OBSTETRICS & GYNECOLOGY

## 2021-09-15 PROCEDURE — 160002 HCHG RECOVERY MINUTES (STAT): Performed by: OBSTETRICS & GYNECOLOGY

## 2021-09-15 PROCEDURE — 700102 HCHG RX REV CODE 250 W/ 637 OVERRIDE(OP): Performed by: ANESTHESIOLOGY

## 2021-09-15 PROCEDURE — 87426 SARSCOV CORONAVIRUS AG IA: CPT | Performed by: OBSTETRICS & GYNECOLOGY

## 2021-09-15 PROCEDURE — 160029 HCHG SURGERY MINUTES - 1ST 30 MINS LEVEL 4: Performed by: OBSTETRICS & GYNECOLOGY

## 2021-09-15 PROCEDURE — 160035 HCHG PACU - 1ST 60 MINS PHASE I: Performed by: OBSTETRICS & GYNECOLOGY

## 2021-09-15 PROCEDURE — 160036 HCHG PACU - EA ADDL 30 MINS PHASE I: Performed by: OBSTETRICS & GYNECOLOGY

## 2021-09-15 PROCEDURE — 88305 TISSUE EXAM BY PATHOLOGIST: CPT

## 2021-09-15 PROCEDURE — 160048 HCHG OR STATISTICAL LEVEL 1-5: Performed by: OBSTETRICS & GYNECOLOGY

## 2021-09-15 PROCEDURE — 700105 HCHG RX REV CODE 258: Performed by: OBSTETRICS & GYNECOLOGY

## 2021-09-15 PROCEDURE — 501394 HCHG SOLUTION SORBITOL 3% 3000ML BAG: Performed by: OBSTETRICS & GYNECOLOGY

## 2021-09-15 RX ORDER — SODIUM CHLORIDE, SODIUM LACTATE, POTASSIUM CHLORIDE, CALCIUM CHLORIDE 600; 310; 30; 20 MG/100ML; MG/100ML; MG/100ML; MG/100ML
INJECTION, SOLUTION INTRAVENOUS CONTINUOUS
Status: ACTIVE | OUTPATIENT
Start: 2021-09-15 | End: 2021-09-15

## 2021-09-15 RX ORDER — OXYCODONE HCL 5 MG/5 ML
5 SOLUTION, ORAL ORAL
Status: COMPLETED | OUTPATIENT
Start: 2021-09-15 | End: 2021-09-15

## 2021-09-15 RX ORDER — OXYCODONE HCL 5 MG/5 ML
10 SOLUTION, ORAL ORAL
Status: COMPLETED | OUTPATIENT
Start: 2021-09-15 | End: 2021-09-15

## 2021-09-15 RX ORDER — HYDRALAZINE HYDROCHLORIDE 20 MG/ML
5 INJECTION INTRAMUSCULAR; INTRAVENOUS
Status: DISCONTINUED | OUTPATIENT
Start: 2021-09-15 | End: 2021-09-15 | Stop reason: HOSPADM

## 2021-09-15 RX ORDER — HYDROMORPHONE HYDROCHLORIDE 1 MG/ML
0.2 INJECTION, SOLUTION INTRAMUSCULAR; INTRAVENOUS; SUBCUTANEOUS
Status: DISCONTINUED | OUTPATIENT
Start: 2021-09-15 | End: 2021-09-15 | Stop reason: HOSPADM

## 2021-09-15 RX ORDER — ONDANSETRON 2 MG/ML
4 INJECTION INTRAMUSCULAR; INTRAVENOUS
Status: DISCONTINUED | OUTPATIENT
Start: 2021-09-15 | End: 2021-09-15 | Stop reason: HOSPADM

## 2021-09-15 RX ORDER — DEXAMETHASONE SODIUM PHOSPHATE 4 MG/ML
INJECTION, SOLUTION INTRA-ARTICULAR; INTRALESIONAL; INTRAMUSCULAR; INTRAVENOUS; SOFT TISSUE PRN
Status: DISCONTINUED | OUTPATIENT
Start: 2021-09-15 | End: 2021-09-15 | Stop reason: SURG

## 2021-09-15 RX ORDER — HALOPERIDOL 5 MG/ML
1 INJECTION INTRAMUSCULAR
Status: DISCONTINUED | OUTPATIENT
Start: 2021-09-15 | End: 2021-09-15 | Stop reason: HOSPADM

## 2021-09-15 RX ORDER — LIDOCAINE HYDROCHLORIDE 20 MG/ML
INJECTION, SOLUTION EPIDURAL; INFILTRATION; INTRACAUDAL; PERINEURAL PRN
Status: DISCONTINUED | OUTPATIENT
Start: 2021-09-15 | End: 2021-09-15 | Stop reason: SURG

## 2021-09-15 RX ORDER — IBUPROFEN 600 MG/1
600 TABLET ORAL EVERY 6 HOURS PRN
Qty: 30 TABLET | Refills: 1 | Status: SHIPPED | OUTPATIENT
Start: 2021-09-15

## 2021-09-15 RX ORDER — DIPHENHYDRAMINE HYDROCHLORIDE 50 MG/ML
12.5 INJECTION INTRAMUSCULAR; INTRAVENOUS
Status: DISCONTINUED | OUTPATIENT
Start: 2021-09-15 | End: 2021-09-15 | Stop reason: HOSPADM

## 2021-09-15 RX ORDER — VASOPRESSIN 20 U/ML
INJECTION PARENTERAL
Status: DISCONTINUED
Start: 2021-09-15 | End: 2021-09-15 | Stop reason: HOSPADM

## 2021-09-15 RX ORDER — KETOROLAC TROMETHAMINE 30 MG/ML
INJECTION, SOLUTION INTRAMUSCULAR; INTRAVENOUS PRN
Status: DISCONTINUED | OUTPATIENT
Start: 2021-09-15 | End: 2021-09-15 | Stop reason: SURG

## 2021-09-15 RX ORDER — ACETAMINOPHEN 500 MG
1000 TABLET ORAL ONCE
Status: COMPLETED | OUTPATIENT
Start: 2021-09-15 | End: 2021-09-15

## 2021-09-15 RX ORDER — BUPIVACAINE HYDROCHLORIDE 2.5 MG/ML
INJECTION, SOLUTION EPIDURAL; INFILTRATION; INTRACAUDAL
Status: DISCONTINUED
Start: 2021-09-15 | End: 2021-09-15 | Stop reason: HOSPADM

## 2021-09-15 RX ORDER — CEFAZOLIN SODIUM 1 G/3ML
INJECTION, POWDER, FOR SOLUTION INTRAMUSCULAR; INTRAVENOUS PRN
Status: DISCONTINUED | OUTPATIENT
Start: 2021-09-15 | End: 2021-09-15 | Stop reason: SURG

## 2021-09-15 RX ORDER — OXYCODONE HYDROCHLORIDE AND ACETAMINOPHEN 5; 325 MG/1; MG/1
1 TABLET ORAL EVERY 4 HOURS PRN
Status: DISCONTINUED | OUTPATIENT
Start: 2021-09-15 | End: 2021-09-15 | Stop reason: HOSPADM

## 2021-09-15 RX ORDER — LABETALOL HYDROCHLORIDE 5 MG/ML
5 INJECTION, SOLUTION INTRAVENOUS
Status: DISCONTINUED | OUTPATIENT
Start: 2021-09-15 | End: 2021-09-15 | Stop reason: HOSPADM

## 2021-09-15 RX ORDER — METHYLERGONOVINE MALEATE 0.2 MG/ML
INJECTION INTRAVENOUS
Status: DISCONTINUED
Start: 2021-09-15 | End: 2021-09-15 | Stop reason: HOSPADM

## 2021-09-15 RX ORDER — IBUPROFEN 600 MG/1
600 TABLET ORAL EVERY 6 HOURS PRN
Status: DISCONTINUED | OUTPATIENT
Start: 2021-09-15 | End: 2021-09-15 | Stop reason: HOSPADM

## 2021-09-15 RX ORDER — HYDROMORPHONE HYDROCHLORIDE 1 MG/ML
0.1 INJECTION, SOLUTION INTRAMUSCULAR; INTRAVENOUS; SUBCUTANEOUS
Status: DISCONTINUED | OUTPATIENT
Start: 2021-09-15 | End: 2021-09-15 | Stop reason: HOSPADM

## 2021-09-15 RX ORDER — SODIUM CHLORIDE, SODIUM LACTATE, POTASSIUM CHLORIDE, CALCIUM CHLORIDE 600; 310; 30; 20 MG/100ML; MG/100ML; MG/100ML; MG/100ML
INJECTION, SOLUTION INTRAVENOUS CONTINUOUS
Status: DISCONTINUED | OUTPATIENT
Start: 2021-09-15 | End: 2021-09-15 | Stop reason: HOSPADM

## 2021-09-15 RX ORDER — HYDROMORPHONE HYDROCHLORIDE 1 MG/ML
0.4 INJECTION, SOLUTION INTRAMUSCULAR; INTRAVENOUS; SUBCUTANEOUS
Status: DISCONTINUED | OUTPATIENT
Start: 2021-09-15 | End: 2021-09-15 | Stop reason: HOSPADM

## 2021-09-15 RX ORDER — ONDANSETRON 2 MG/ML
INJECTION INTRAMUSCULAR; INTRAVENOUS PRN
Status: DISCONTINUED | OUTPATIENT
Start: 2021-09-15 | End: 2021-09-15 | Stop reason: SURG

## 2021-09-15 RX ADMIN — KETOROLAC TROMETHAMINE 30 MG: 30 INJECTION, SOLUTION INTRAMUSCULAR at 10:48

## 2021-09-15 RX ADMIN — ONDANSETRON 4 MG: 2 INJECTION INTRAMUSCULAR; INTRAVENOUS at 10:48

## 2021-09-15 RX ADMIN — LIDOCAINE HYDROCHLORIDE 100 MG: 20 INJECTION, SOLUTION EPIDURAL; INFILTRATION; INTRACAUDAL at 10:13

## 2021-09-15 RX ADMIN — OXYCODONE HYDROCHLORIDE 10 MG: 5 SOLUTION ORAL at 11:27

## 2021-09-15 RX ADMIN — PROPOFOL 200 MG: 10 INJECTION, EMULSION INTRAVENOUS at 10:13

## 2021-09-15 RX ADMIN — SODIUM CHLORIDE, POTASSIUM CHLORIDE, SODIUM LACTATE AND CALCIUM CHLORIDE: 600; 310; 30; 20 INJECTION, SOLUTION INTRAVENOUS at 09:35

## 2021-09-15 RX ADMIN — FENTANYL CITRATE 25 MCG: 50 INJECTION INTRAMUSCULAR; INTRAVENOUS at 11:26

## 2021-09-15 RX ADMIN — CEFAZOLIN 2 G: 330 INJECTION, POWDER, FOR SOLUTION INTRAMUSCULAR; INTRAVENOUS at 10:16

## 2021-09-15 RX ADMIN — FENTANYL CITRATE 50 MCG: 50 INJECTION, SOLUTION INTRAMUSCULAR; INTRAVENOUS at 10:18

## 2021-09-15 RX ADMIN — LIDOCAINE HYDROCHLORIDE 0.5 ML: 10 INJECTION, SOLUTION INFILTRATION; PERINEURAL at 09:35

## 2021-09-15 RX ADMIN — FENTANYL CITRATE 50 MCG: 50 INJECTION, SOLUTION INTRAMUSCULAR; INTRAVENOUS at 10:31

## 2021-09-15 RX ADMIN — ACETAMINOPHEN 1000 MG: 500 TABLET ORAL at 09:35

## 2021-09-15 RX ADMIN — DEXAMETHASONE SODIUM PHOSPHATE 8 MG: 4 INJECTION, SOLUTION INTRA-ARTICULAR; INTRALESIONAL; INTRAMUSCULAR; INTRAVENOUS; SOFT TISSUE at 10:31

## 2021-09-15 ASSESSMENT — FIBROSIS 4 INDEX: FIB4 SCORE: 0.81

## 2021-09-15 ASSESSMENT — PAIN DESCRIPTION - PAIN TYPE: TYPE: SURGICAL PAIN

## 2021-09-15 NOTE — OR SURGEON
Immediate Post OP Note    PreOp Diagnosis: Retained IUD.     PMB.      PostOp Diagnosis: same.      Procedure(s):  HYSTEROSCOPY, WITH VIDEO IMAGING - Wound Class: Clean Contaminated  DILATION AND CURETTAGE - Wound Class: Clean Contaminated  REMOVAL, INTRAUTERINE DEVICE - Wound Class: Clean Contaminated    Surgeon(s):  Ilir Mcgee M.D.    Anesthesiologist/Type of Anesthesia:  Anesthesiologist: Nelson Rebollar M.D./General    Surgical Staff:  Circulator: Xiomy Neves R.N.  Scrub Person: Gisell Barrera    Specimens removed if any:  ID Type Source Tests Collected by Time Destination   A : intrauterine device Other Other GROSS ONLY REQUEST Ilir Mcgee M.D. 9/15/2021 10:42 AM    B : Endometrial Currettings Other Other PATHOLOGY SPECIMEN Ilir Mcgee M.D. 9/15/2021 10:45 AM        Estimated Blood Loss: minimal.  Fluid input- 1490ml sorbitol   defecit-270ml.  Findings: normal uterus IUD in place with coiled up strings and bent stem.lining thin and bald no lesions identified.    Complications: none.        9/15/2021 10:56 AM Ilir Mcgee M.D.

## 2021-09-15 NOTE — ANESTHESIA PROCEDURE NOTES
Airway    Date/Time: 9/15/2021 10:14 AM  Performed by: Nelson Rebollar M.D.  Authorized by: Nelson Rebollar M.D.     Location:  OR  Urgency:  Elective  Indications for Airway Management:  Anesthesia      Spontaneous Ventilation: absent    Sedation Level:  Deep  Preoxygenated: Yes    Mask Difficulty Assessment:  0 - not attempted  Final Airway Type:  Supraglottic airway  Final Supraglottic Airway:  Standard LMA    SGA Size:  4  Number of Attempts at Approach:  1  Number of Other Approaches Attempted:  0

## 2021-09-15 NOTE — OR NURSING
1053  RECEIVED PATIENT FROM OR.  REPORT FROM DR. URIAS.   NO  AIRWAY IN PLACE.  RESPIRATIONS ARE EVEN AND UNLABORED.  HORACE PAD IN PLACE.       1110  FINGER STICK GLUCOSE 110.    1116  DAUGHTER FEDE CALLED AND UPDATED.      1126  MEDICATED WITH IV FENTANYL.    1127  MEDICATED WITH PO OXYCODONE.    1205  REPORT TO MATT CORLEY.    1235   RECEIVED REPORT FROM MATT CORLEY.  RESUMED CARE OF PATIENT.    1340  UP TO THE BATHROOM.    1351  TO PHASE 2.

## 2021-09-15 NOTE — OR NURSING
1205 Handoff received from Ansley CORLEY.   1210 02 2L applied to maintain sat >90%.     1235 Handoff to ansley CORLEY.

## 2021-09-15 NOTE — DISCHARGE INSTRUCTIONS
ACTIVITY: Rest and take it easy for the first 24 hours.  A responsible adult is recommended to remain with you during that time.  It is normal to feel sleepy.  We encourage you to not do anything that requires balance, judgment or coordination.    MILD FLU-LIKE SYMPTOMS ARE NORMAL. YOU MAY EXPERIENCE GENERALIZED MUSCLE ACHES, THROAT IRRITATION, HEADACHE AND/OR SOME NAUSEA.    FOR 24 HOURS DO NOT:  Drive, operate machinery or run household appliances.  Drink beer or alcoholic beverages.   Make important decisions or sign legal documents.    SPECIAL INSTRUCTIONS:  SEE HYSTEROSCOPY INSTRUCTION SHEET    DIET: To avoid nausea, slowly advance diet as tolerated, avoiding spicy or greasy foods for the first day.  Add more substantial food to your diet according to your physician's instructions.  Babies can be fed formula or breast milk as soon as they are hungry.  INCREASE FLUIDS AND FIBER TO AVOID CONSTIPATION.    SURGICAL DRESSING/BATHING: *MAY SHOWER TOMORROW.  NO TUB BATHS, HOT TUBS OR SWIMMING UNTIL APPROVED BY PHYSICIAN**    FOLLOW-UP APPOINTMENT:  A follow-up appointment should be arranged with your doctor in **FOLLOW UP WITH DR. KHAN *; call to schedule.    You should CALL YOUR PHYSICIAN if you develop:  Fever greater than 101 degrees F.  Pain not relieved by medication, or persistent nausea or vomiting.  Excessive bleeding (blood soaking through dressing) or unexpected drainage from the wound.  Extreme redness or swelling around the incision site, drainage of pus or foul smelling drainage.  Inability to urinate or empty your bladder within 8 hours.  Problems with breathing or chest pain.    You should call 911 if you develop problems with breathing or chest pain.  If you are unable to contact your doctor or surgical center, you should go to the nearest emergency room or urgent care center.  Physician's telephone #: *DR. KHAN [phone] **    If any questions arise, call your doctor.  If your doctor is  not available, please feel free to call the Surgical Center at [phone]. The Contact Center is open Monday through Friday 7AM to 5PM and may speak to a nurse at [phone], or toll free at [phone].     A registered nurse may call you a few days after your surgery to see how you are doing after your procedure.    MEDICATIONS: Resume taking daily medication.  Take prescribed pain medication with food.  If no medication is prescribed, you may take non-aspirin pain medication if needed.  PAIN MEDICATION CAN BE VERY CONSTIPATING.  Take a stool softener or laxative such as senokot, pericolace, or milk of magnesia if needed.      Prescription given for *____________NONE_______________________**.  Last pain medication given at *11:30 AM**.    If your physician has prescribed pain medication that includes Acetaminophen (Tylenol), do not take additional Acetaminophen (Tylenol) while taking the prescribed medication.    Depression / Suicide Risk    As you are discharged from this [facility] facility, it is important to learn how to keep safe from harming yourself.    Recognize the warning signs:  · Abrupt changes in personality, positive or negative- including increase in energy   · Giving away possessions  · Change in eating patterns- significant weight changes-  positive or negative  · Change in sleeping patterns- unable to sleep or sleeping all the time   · Unwillingness or inability to communicate  · Depression  · Unusual sadness, discouragement and loneliness  · Talk of wanting to die  · Neglect of personal appearance   · Rebelliousness- reckless behavior  · Withdrawal from people/activities they love  · Confusion- inability to concentrate     If you or a loved one observes any of these behaviors or has concerns about self-harm, here's what you can do:  · Talk about it- your feelings and reasons for harming yourself  · Remove any means that you might use to hurt yourself (examples: pills, rope, extension  cords, firearm)  · Get professional help from the community (Mental Health, Substance Abuse, psychological counseling)  · Do not be alone:Call your Safe Contact- someone whom you trust who will be there for you.  · Call your local CRISIS HOTLINE 966-3090 or 139-288-9022  · Call your local Children's Mobile Crisis Response Team [place] (392) 369-6696 or www.StrongLoop  · Call the toll free National Suicide Prevention Hotlines   · National Suicide Prevention Lifeline 911-195-CPEJ (1238)  · National Hope Line Network 800-SUICIDE (499-4782)

## 2021-09-15 NOTE — ANESTHESIA PREPROCEDURE EVALUATION
Abnormal uterine bleeding. Denies any CP/SOB or problems with anesthesia in the past.    Relevant Problems   NEURO   (positive) History of DVT (deep vein thrombosis)      CARDIAC   (positive) Chronic venous insufficiency   (positive) Deep vein thrombosis (DVT) of proximal lower extremity, unspecified chronicity, unspecified laterality (HCC)       Physical Exam    Airway   Mallampati: II  TM distance: >3 FB  Neck ROM: full       Cardiovascular - normal exam  Rhythm: regular  Rate: normal  (-) murmur     Dental - normal exam           Pulmonary - normal exam  Breath sounds clear to auscultation     Abdominal    Neurological - normal exam                 Anesthesia Plan    ASA 2       Plan - general       Airway plan will be LMA          Induction: intravenous    Postoperative Plan: Postoperative administration of opioids is intended.    Pertinent diagnostic labs and testing reviewed    Informed Consent:    Anesthetic plan and risks discussed with patient.    Use of blood products discussed with: patient whom consented to blood products.

## 2021-09-15 NOTE — OP REPORT
DATE OF SERVICE:  09/15/2021     INDICATIONS:  The patient is a 55 years old  4 para 4, postmenopausal   for the past 2 years.  Had abnormal uterine bleeding with a retained ParaGard   IUD, which was attempted in a different office and failure to remove and was   referred here.  The patient is also diabetic, on insulin, well controlled.  In   view of failed IUD removal with strings not visible, the patient scheduled   for hysteroscopy and foreign body removal and dilatation and curettage.     PREOPERATIVE DIAGNOSIS:  Retained foreign body with postmenopausal bleeding.     POSTOPERATIVE DIAGNOSIS:  Retained foreign body with postmenopausal bleeding.     PROCEDURES PERFORMED:  Dilation and curettage, hysteroscopy and foreign body   removal.     ANESTHESIA:  General.     ANESTHESIOLOGIST:  Nelson Rebollar MD     SURGEON:  Ilir Mcgee MD     ESTIMATED BLOOD LOSS:  Minimal.     FLUID INPUT: 1490 mL of sorbitol, deficit is 270 mL.     DESCRIPTION OF PROCEDURE:  The patient was consented and taken to the   operating room.  General anesthesia was induced without difficulty, placed in   dorsal lithotomy position, prepped and draped in the normal sterile fashion.    Bladder was straight catheterized.  A red Darnell catheter was placed, 100 mL of   clear urine drained.  Bimanual examination was performed.  Uterus was found   to be anteverted, mobile, normal size.  The cervix showed to be tight os.    Vagina was grossly normal.  A heavy-weighted speculum was placed in the   posterior aspect of the vaginal wall.  Anterior lip of the cervix was held   with tenaculum.  A tight os was noted and it was difficult to pass the sound.    The posterior lip of the cervix was held with a Chew tenaculum and using   a curved Hegar's dilator ___, the probe was able to pass and sounded to 7 cm.    Then, cervix was serially dilated up to #8 Hegar's dilator.  A diagnostic   hysteroscope was first introduced and advanced under  direct visualization.    IUD was visualized with coiled up strings around the stem of the IUD.  The   diagnostic hysteroscope was removed and changed into an operative hysteroscope   with an operative channel and further dilatation was carried out and the   hysteroscope was advanced into the cavity under direct visualization and using   the operative channel, alligator forceps was introduced and identified at the   tip and using the alligator forceps, the stem was grasped and the whole IUD   was removed along with the hysteroscope.  The IUD was sent for grossing and a   hysteroscope was introduced to further and endometrial cavity was evaluated.    The lining was very ___ uterine cornu was visualized.  No other lesions was   noted.  Hysteroscope was withdrawn and curettage of all the walls of the   uterine lining was carried out and sent for pathology.  All the instruments   were removed.  No bleeding from the cervical os or the tenaculum held site was   noted.  The patient was extubated and transferred to recovery room under   stable condition.  Input fluid was 1490 mL of sorbitol, deficit was 270 mL.   Counts were correct x2.        ______________________________  MD LEONARDO Waters/SHILPI/JAN/JAN    DD:  09/15/2021 11:04  DT:  09/15/2021 11:36    Job#:  [id number]

## 2021-09-15 NOTE — OR NURSING
1351 Received patient from PACU and received report from Jodie CORLEY. [facility]. Denies pain and  nausea. Patient's ride called to  patient.     1353 patient tolerating PO fluids, patient dressed, IV removed.    1356 Patients daughter brought to bedside by RN.     1403 Rn went over discharge instructions with patients daughter.     1412 Patient discharged via wheelchair to responsible adult.

## 2021-09-15 NOTE — ANESTHESIA TIME REPORT
Anesthesia Start and Stop Event Times     Date Time Event    9/15/2021 0957 Ready for Procedure     1008 Anesthesia Start     1055 Anesthesia Stop        Responsible Staff  09/15/21    Name Role Begin End    Nleson Rebollar M.D. Anesth 1008 1055        Preop Diagnosis (Free Text):  Pre-op Diagnosis     ABNORMAL UTERINE BLEEDING, RETAINED INTRAUTERINE DEVICE        Preop Diagnosis (Codes):    Post op Diagnosis  Abnormal uterine bleeding      Premium Reason  Non-Premium    Comments:

## 2021-09-16 ASSESSMENT — PAIN SCALES - GENERAL: PAIN_LEVEL: 2

## 2021-09-16 NOTE — ANESTHESIA POSTPROCEDURE EVALUATION
Patient: Kaya Keita    Procedure Summary     Date: 09/15/21 Room / Location: [facility] ROOM 23 / SURGERY SAME DAY [facility]    Anesthesia Start: 1008 Anesthesia Stop: 1055    Procedures:       HYSTEROSCOPY, WITH VIDEO IMAGING (N/A Uterus)      DILATION AND CURETTAGE (N/A Uterus)      REMOVAL, INTRAUTERINE DEVICE (N/A Uterus) Diagnosis: (ABNORMAL UTERINE BLEEDING, RETAINED INTRAUTERINE DEVICE)    Surgeons: Ilir Mcgee M.D. Responsible Provider: Nelson Rebollar M.D.    Anesthesia Type: general ASA Status: 2          Final Anesthesia Type: general  Last vitals  BP   Blood Pressure: 116/60    Temp   36.2 °C (97.2 °F)    Pulse   77   Resp   16    SpO2   93 %      Anesthesia Post Evaluation    Patient location during evaluation: PACU  Patient participation: complete - patient participated  Level of consciousness: awake and alert  Pain score: 2    Airway patency: patent  Anesthetic complications: no  Cardiovascular status: hemodynamically stable  Respiratory status: acceptable  Hydration status: euvolemic    PONV: none          There were no known complications for this encounter.     Nurse Pain Score: 0 (NPRS)

## 2024-04-22 ENCOUNTER — HOSPITAL ENCOUNTER (EMERGENCY)
Facility: MEDICAL CENTER | Age: 59
End: 2024-04-23
Attending: EMERGENCY MEDICINE
Payer: COMMERCIAL

## 2024-04-22 ENCOUNTER — OFFICE VISIT (OUTPATIENT)
Dept: URGENT CARE | Facility: PHYSICIAN GROUP | Age: 59
End: 2024-04-22
Payer: COMMERCIAL

## 2024-04-22 VITALS
BODY MASS INDEX: 36.79 KG/M2 | HEIGHT: 60 IN | TEMPERATURE: 97.4 F | WEIGHT: 187.39 LBS | SYSTOLIC BLOOD PRESSURE: 108 MMHG | HEART RATE: 85 BPM | RESPIRATION RATE: 16 BRPM | OXYGEN SATURATION: 96 % | DIASTOLIC BLOOD PRESSURE: 68 MMHG

## 2024-04-22 DIAGNOSIS — N30.00 ACUTE CYSTITIS WITHOUT HEMATURIA: ICD-10-CM

## 2024-04-22 DIAGNOSIS — R10.2 SUPRAPUBIC ABDOMINAL PAIN: ICD-10-CM

## 2024-04-22 DIAGNOSIS — R10.2 PELVIC PAIN: ICD-10-CM

## 2024-04-22 DIAGNOSIS — R10.32 LEFT LOWER QUADRANT ABDOMINAL PAIN: ICD-10-CM

## 2024-04-22 LAB
ALBUMIN SERPL BCP-MCNC: 3.8 G/DL (ref 3.2–4.9)
ALBUMIN/GLOB SERPL: 1.3 G/DL
ALP SERPL-CCNC: 76 U/L (ref 30–99)
ALT SERPL-CCNC: 21 U/L (ref 2–50)
ANION GAP SERPL CALC-SCNC: 11 MMOL/L (ref 7–16)
APPEARANCE UR: CLEAR
APPEARANCE UR: CLEAR
AST SERPL-CCNC: 18 U/L (ref 12–45)
BACTERIA #/AREA URNS HPF: NEGATIVE /HPF
BASOPHILS # BLD AUTO: 0.4 % (ref 0–1.8)
BASOPHILS # BLD: 0.03 K/UL (ref 0–0.12)
BILIRUB SERPL-MCNC: 0.2 MG/DL (ref 0.1–1.5)
BILIRUB UR QL STRIP.AUTO: NEGATIVE
BILIRUB UR STRIP-MCNC: NEGATIVE MG/DL
BUN SERPL-MCNC: 14 MG/DL (ref 8–22)
CALCIUM ALBUM COR SERPL-MCNC: 9.1 MG/DL (ref 8.5–10.5)
CALCIUM SERPL-MCNC: 8.9 MG/DL (ref 8.5–10.5)
CHLORIDE SERPL-SCNC: 102 MMOL/L (ref 96–112)
CO2 SERPL-SCNC: 25 MMOL/L (ref 20–33)
COLOR UR AUTO: NORMAL
COLOR UR: YELLOW
CREAT SERPL-MCNC: 0.57 MG/DL (ref 0.5–1.4)
EOSINOPHIL # BLD AUTO: 0.32 K/UL (ref 0–0.51)
EOSINOPHIL NFR BLD: 4.8 % (ref 0–6.9)
EPI CELLS #/AREA URNS HPF: NEGATIVE /HPF
ERYTHROCYTE [DISTWIDTH] IN BLOOD BY AUTOMATED COUNT: 43.6 FL (ref 35.9–50)
GFR SERPLBLD CREATININE-BSD FMLA CKD-EPI: 106 ML/MIN/1.73 M 2
GLOBULIN SER CALC-MCNC: 3 G/DL (ref 1.9–3.5)
GLUCOSE SERPL-MCNC: 193 MG/DL (ref 65–99)
GLUCOSE UR STRIP.AUTO-MCNC: >=1000 MG/DL
GLUCOSE UR STRIP.AUTO-MCNC: NORMAL MG/DL
HCT VFR BLD AUTO: 37.1 % (ref 37–47)
HGB BLD-MCNC: 12.3 G/DL (ref 12–16)
HYALINE CASTS #/AREA URNS LPF: ABNORMAL /LPF
IMM GRANULOCYTES # BLD AUTO: 0.01 K/UL (ref 0–0.11)
IMM GRANULOCYTES NFR BLD AUTO: 0.1 % (ref 0–0.9)
KETONES UR STRIP.AUTO-MCNC: NEGATIVE MG/DL
KETONES UR STRIP.AUTO-MCNC: NEGATIVE MG/DL
LEUKOCYTE ESTERASE UR QL STRIP.AUTO: ABNORMAL
LEUKOCYTE ESTERASE UR QL STRIP.AUTO: NORMAL
LYMPHOCYTES # BLD AUTO: 2.71 K/UL (ref 1–4.8)
LYMPHOCYTES NFR BLD: 40.6 % (ref 22–41)
MCH RBC QN AUTO: 27.3 PG (ref 27–33)
MCHC RBC AUTO-ENTMCNC: 33.2 G/DL (ref 32.2–35.5)
MCV RBC AUTO: 82.3 FL (ref 81.4–97.8)
MICRO URNS: ABNORMAL
MONOCYTES # BLD AUTO: 0.48 K/UL (ref 0–0.85)
MONOCYTES NFR BLD AUTO: 7.2 % (ref 0–13.4)
NEUTROPHILS # BLD AUTO: 3.12 K/UL (ref 1.82–7.42)
NEUTROPHILS NFR BLD: 46.9 % (ref 44–72)
NITRITE UR QL STRIP.AUTO: NEGATIVE
NITRITE UR QL STRIP.AUTO: NEGATIVE
NRBC # BLD AUTO: 0 K/UL
NRBC BLD-RTO: 0 /100 WBC (ref 0–0.2)
PH UR STRIP.AUTO: 6 [PH] (ref 5–8)
PH UR STRIP.AUTO: 7 [PH] (ref 5–8)
PLATELET # BLD AUTO: 228 K/UL (ref 164–446)
PMV BLD AUTO: 11.6 FL (ref 9–12.9)
POTASSIUM SERPL-SCNC: 4.1 MMOL/L (ref 3.6–5.5)
PROT SERPL-MCNC: 6.8 G/DL (ref 6–8.2)
PROT UR QL STRIP: NEGATIVE MG/DL
PROT UR QL STRIP: NEGATIVE MG/DL
RBC # BLD AUTO: 4.51 M/UL (ref 4.2–5.4)
RBC # URNS HPF: ABNORMAL /HPF
RBC UR QL AUTO: NEGATIVE
RBC UR QL AUTO: NEGATIVE
SODIUM SERPL-SCNC: 138 MMOL/L (ref 135–145)
SP GR UR STRIP.AUTO: 1.01
SP GR UR STRIP.AUTO: 1.01
UROBILINOGEN UR STRIP-MCNC: NORMAL MG/DL
UROBILINOGEN UR STRIP.AUTO-MCNC: 0.2 MG/DL
WBC # BLD AUTO: 6.7 K/UL (ref 4.8–10.8)
WBC #/AREA URNS HPF: ABNORMAL /HPF

## 2024-04-22 PROCEDURE — 99283 EMERGENCY DEPT VISIT LOW MDM: CPT

## 2024-04-22 PROCEDURE — 3078F DIAST BP <80 MM HG: CPT | Performed by: FAMILY MEDICINE

## 2024-04-22 PROCEDURE — 87086 URINE CULTURE/COLONY COUNT: CPT

## 2024-04-22 PROCEDURE — 3074F SYST BP LT 130 MM HG: CPT | Performed by: FAMILY MEDICINE

## 2024-04-22 PROCEDURE — 99203 OFFICE O/P NEW LOW 30 MIN: CPT | Performed by: FAMILY MEDICINE

## 2024-04-22 PROCEDURE — 80053 COMPREHEN METABOLIC PANEL: CPT

## 2024-04-22 PROCEDURE — 81002 URINALYSIS NONAUTO W/O SCOPE: CPT | Performed by: FAMILY MEDICINE

## 2024-04-22 PROCEDURE — 36415 COLL VENOUS BLD VENIPUNCTURE: CPT

## 2024-04-22 PROCEDURE — 81001 URINALYSIS AUTO W/SCOPE: CPT

## 2024-04-22 PROCEDURE — 85025 COMPLETE CBC W/AUTO DIFF WBC: CPT

## 2024-04-22 RX ORDER — GABAPENTIN 300 MG/1
300 CAPSULE ORAL 3 TIMES DAILY
COMMUNITY

## 2024-04-22 RX ORDER — EMPAGLIFLOZIN 25 MG/1
TABLET, FILM COATED ORAL
COMMUNITY

## 2024-04-22 RX ORDER — LEVOTHYROXINE SODIUM 0.05 MG/1
50 TABLET ORAL
COMMUNITY

## 2024-04-22 RX ORDER — SULFAMETHOXAZOLE AND TRIMETHOPRIM 800; 160 MG/1; MG/1
1 TABLET ORAL 2 TIMES DAILY
Qty: 14 TABLET | Refills: 0 | Status: ACTIVE | OUTPATIENT
Start: 2024-04-22 | End: 2024-04-29

## 2024-04-22 RX ORDER — SULFAMETHOXAZOLE AND TRIMETHOPRIM 800; 160 MG/1; MG/1
1 TABLET ORAL ONCE
Status: COMPLETED | OUTPATIENT
Start: 2024-04-23 | End: 2024-04-23

## 2024-04-22 ASSESSMENT — ENCOUNTER SYMPTOMS
NAUSEA: 0
CHILLS: 0
FEVER: 0
ABDOMINAL PAIN: 1
FLANK PAIN: 0
MYALGIAS: 0
DIZZINESS: 0
SHORTNESS OF BREATH: 0
SORE THROAT: 0
COUGH: 0
VOMITING: 0

## 2024-04-22 ASSESSMENT — PAIN DESCRIPTION - PAIN TYPE: TYPE: ACUTE PAIN

## 2024-04-23 VITALS
DIASTOLIC BLOOD PRESSURE: 78 MMHG | WEIGHT: 185.63 LBS | HEART RATE: 66 BPM | BODY MASS INDEX: 36.44 KG/M2 | OXYGEN SATURATION: 94 % | RESPIRATION RATE: 18 BRPM | TEMPERATURE: 98.6 F | HEIGHT: 60 IN | SYSTOLIC BLOOD PRESSURE: 126 MMHG

## 2024-04-23 PROCEDURE — 700102 HCHG RX REV CODE 250 W/ 637 OVERRIDE(OP): Performed by: EMERGENCY MEDICINE

## 2024-04-23 PROCEDURE — A9270 NON-COVERED ITEM OR SERVICE: HCPCS | Performed by: EMERGENCY MEDICINE

## 2024-04-23 RX ORDER — IBUPROFEN 600 MG/1
600 TABLET ORAL ONCE
Status: COMPLETED | OUTPATIENT
Start: 2024-04-23 | End: 2024-04-23

## 2024-04-23 RX ADMIN — IBUPROFEN 600 MG: 600 TABLET, FILM COATED ORAL at 00:10

## 2024-04-23 RX ADMIN — SULFAMETHOXAZOLE AND TRIMETHOPRIM 1 TABLET: 800; 160 TABLET ORAL at 00:05

## 2024-04-23 NOTE — ED NOTES
Patient ambulatory up to bathroom with steady gait, educated on urine clean catch method, attempting to provide sample now.

## 2024-04-23 NOTE — ED PROVIDER NOTES
"ED Provider Note    CHIEF COMPLAINT  Chief Complaint   Patient presents with    Back Pain     Lower back pain that started on Wednesday.    UTI     Pt went to  today, told she has a \"UTI that may have traveled to [facility]\" and to come to the ED.     EXTERNAL RECORDS REVIEWED  The patient's records show that she was seen at urgent care today for left lower abdominal quadrant pain and suprapubic pain.     HPI/ROS  LIMITATION TO HISTORY   Select: : None  OUTSIDE HISTORIAN(S):  Family  member was present at bedside to provide additional context to history.    Kaya Keita is a 58 y.o. female with a history of diabetes, low thyroid counts, and hypertension who presents to the Emergency Department with lower abdomen and back pain onset 5 days ago. The patient went to urgent care today and was diagnosed with a UTI but received no medication. States associated dysuria. Denies fever, chills, nausea, or vomiting. Denies taking any medications today.     PAST MEDICAL HISTORY  Past Medical History:   Diagnosis Date    Deep venous thrombosis (HCC)     twice     Diabetes (HCC)     DVT (deep venous thrombosis) (HCC)     Hyperlipidemia       SURGICAL HISTORY  Past Surgical History:   Procedure Laterality Date    NJ HYSTEROSCOPY,DX,SEP PROC N/A 9/15/2021    Procedure: HYSTEROSCOPY, WITH VIDEO IMAGING;  Surgeon: Ilir Mcgee M.D.;  Location: SURGERY SAME DAY [facility];  Service: Gynecology    NJ REMOVE INTRAUTERINE DEVICE N/A 9/15/2021    Procedure: REMOVAL, INTRAUTERINE DEVICE;  Surgeon: Ilir Mcgee M.D.;  Location: SURGERY SAME DAY [facility];  Service: Gynecology    DILATION AND CURETTAGE N/A 9/15/2021    Procedure: DILATION AND CURETTAGE;  Surgeon: Ilir Mcgee M.D.;  Location: SURGERY SAME DAY [facility];  Service: Gynecology    GYN SURGERY  1995    left breast lump removed    OTHER ABDOMINAL SURGERY  1989    gallbladder      FAMILY HISTORY  Family History   Problem Relation Age of Onset    Clotting " Disorder Neg Hx      SOCIAL HISTORY   reports that she has never smoked. She has never used smokeless tobacco. She reports that she does not currently use alcohol. She reports that she does not use drugs.    CURRENT MEDICATIONS  Discharge Medication List as of 4/23/2024 12:01 AM        CONTINUE these medications which have NOT CHANGED    Details   gabapentin (NEURONTIN) 300 MG Cap Take 300 mg by mouth 3 times a day., Historical Med      levothyroxine (SYNTHROID) 50 MCG Tab Take 50 mcg by mouth every morning on an empty stomach., Historical Med      Empagliflozin (JARDIANCE) 25 MG Tab Take  by mouth., Historical Med      Liraglutide (VICTOZA SC) Inject  under the skin., Historical Med      ibuprofen (MOTRIN) 600 MG Tab Take 1 Tablet by mouth every 6 hours as needed., Disp-30 Tablet, R-1, Normal      aspirin 81 MG EC tablet Take 1 Tab by mouth every day., Disp-30 Tab, OTC      TRUEPLUS PEN NEEDLES 31G X 6 MM Misc USE FOR INJECTING INSULIN ONE TIME PER DAY, R-1, BUNNY, Historical Med      atorvastatin (LIPITOR) 20 MG Tab atorvastatin 20 mg tablet   Take 1 tablet every day by oral route for 90 days., Historical Med      lisinopril (PRINIVIL) 2.5 MG Tab lisinopril 2.5 mg tablet   Take 1 tablet every day by oral route for 90 days., Historical Med      metformin (GLUCOPHAGE) 1000 MG tablet metformin 1,000 mg tablet   Take 1 tablet twice a day by oral route., Historical Med      insulin detemir (LEVEMIR FLEXTOUCH) 100 UNIT/ML injection PEN Inject 20 Units as instructed 2 times a day., Another Facility           ALLERGIES  Patient has no known allergies.    PHYSICAL EXAM  /69   Pulse 80   Temp 36.7 °C (98.1 °F) (Temporal)   Resp 18   Ht 1.524 m (5')   Wt 84.2 kg (185 lb 10 oz)   SpO2 99%      Constitutional: Nontoxic appearing. Alert in no apparent distress.  HENT: Normocephalic, Atraumatic. Bilateral external ears normal. Nose normal.  Moist mucous membranes.  Oropharynx clear.  Eyes: Pupils are equal and  reactive. Conjunctiva normal.   Neck: Supple, full range of motion  Heart: Regular rate and rhythm.  No murmurs.    Lungs: No respiratory distress, normal work of breathing. Lungs clear to auscultation bilaterally.  Abdomen Soft, no distention.  Suprapubic tenderness.   Back: No CVA tenderness.   Musculoskeletal: Atraumatic. No obvious deformities noted.  No lower extremity edema.  Skin: Warm, Dry.  No erythema, No rash.   Neurologic: Alert and oriented x3. Moving all extremities spontaneously without focal deficits.  Psychiatric: Affect normal, Mood normal, Appears appropriate and not intoxicated.    DIAGNOSTIC STUDIES / PROCEDURES    LABS  Labs Reviewed   URINALYSIS,CULTURE IF INDICATED - Abnormal; Notable for the following components:       Result Value    Glucose >=1000 (*)     Leukocyte Esterase Moderate (*)     All other components within normal limits   URINE MICROSCOPIC (W/UA) - Abnormal; Notable for the following components:    WBC 10-20 (*)     All other components within normal limits   COMP METABOLIC PANEL - Abnormal; Notable for the following components:    Glucose 193 (*)     All other components within normal limits   CBC WITH DIFFERENTIAL   ESTIMATED GFR   URINE CULTURE(NEW)     COURSE & MEDICAL DECISION MAKING    9:57 PM - Patient seen and examined at bedside. Discussed plan of care, including blood work analysis. Patient agrees to the plan of care. Ordered for UA,CMP, and CBC w/Diff to evaluate her symptoms.       ASSESSMENT, COURSE AND PLAN  Care Narrative: Patient with history of DM presents with few day history of lower abdominal and back pain as well as dysuria.  She is well appearing with normal vitals on arrival.  UA shows possible infection.  Labs are reassuring without leukocytosis or renal dysfunction.  Glucose is mildly elevated.  Considered imaging however exam does not seem consistent with surgical process such as appendicitis, diverticulitis, nephrolithiasis.  Will plan to treat for  UTI with return precautions.    Upon reassessment, patient is resting comfortably with normal vital signs.  No new complaints at this time.  Discussed results with patient and/or family as well as importance of primary care follow up.  Patient understands plan of care and strict return precautions for new or changing symptoms    ADDITIONAL PROBLEM LIST  Problem #1: Acute cystitis - discharge with bactrim, given return precautions for worsening symptoms    Problem #2: Hyperglycemia - continue DM medications and close monitoring      DISPOSITION AND DISCUSSIONS  Escalation of care considered, and ultimately not performed:diagnostic imaginge    Decision tools and prescription drugs considered including, but not limited to: Pain Medications OTC medications should be sufficient .        DISPOSITION:  Patient will be discharged home in stable condition.    FOLLOW UP:  SAJI De GuzmanP.RStacyNStacy  [address]  [address]  [facility] [id number] [phone]    Schedule an appointment as soon as possible for a visit       [facility], Emergency Dept  [address] [facility] [address] [phone]    If symptoms worsen      OUTPATIENT MEDICATIONS:  Discharge Medication List as of 4/23/2024 12:01 AM        START taking these medications    Details   sulfamethoxazole-trimethoprim (BACTRIM DS) 800-160 MG tablet Take 1 Tablet by mouth 2 times a day for 7 days., Disp-14 Tablet, R-0, Normal           FINAL DIAGNOSIS  1. Acute cystitis without hematuria        The note accurately reflects work and decisions made by me.  Lisa Casanova M.D.  4/23/2024  11:24 AM     Hung VARELA (Evaibe), am scribing for, and in the presence of, Lisa Casanova M.D..    Electronically signed by: Hung Sun (Evaibmitchel), 4/22/2024    Lisa VARELA M.D. personally performed the services described in this documentation, as scribed by Hung Sun in my presence, and it is both accurate and complete.

## 2024-04-23 NOTE — DISCHARGE INSTRUCTIONS
You were seen in the Emergency Department for back pain and urinary symptoms.    Labs were completed without significant acute abnormalities other than mild elevated blood sugar.    Please use 1,000mg of tylenol or 600mg of ibuprofen every 6 hours as needed for pain.  Take antibiotics as directed and drink plenty of fluids.    Please follow up with your primary care physician.    Return to the Emergency Department with worsening abdominal pain, fevers, vomiting, or other concerns.

## 2024-04-23 NOTE — PROGRESS NOTES
Subjective:   Kaya Keita is a 58 y.o. female who presents for Abdominal Pain (Lower abdominal pain in the pelvic area, hips and also the low back x 5 days)        58-year-old with a history of diabetes mellitus presents urgent care with chief complaint of worsening suprapubic left lower quadrant abdominal pain and pelvic pain over the past 5 days.    A qualified  was used to interpret   during this encounter.  's name/ID number was Julia  and mode of interpretation was IPad   .        Abdominal Pain  This is a new problem. The current episode started in the past 7 days. The onset quality is gradual. The problem occurs intermittently. The problem has been unchanged. The pain is located in the LLQ and suprapubic region. The pain is moderate. The quality of the pain is aching. The abdominal pain radiates to the pelvis. Pertinent negatives include no dysuria, fever, frequency, hematuria, myalgias, nausea or vomiting. Her past medical history is significant for abdominal surgery.     PMH:  has a past medical history of Deep venous thrombosis (HCC), Diabetes (HCC), DVT (deep venous thrombosis) (HCC), and Hyperlipidemia.  MEDS:   Current Outpatient Medications:     gabapentin (NEURONTIN) 300 MG Cap, Take 300 mg by mouth 3 times a day., Disp: , Rfl:     levothyroxine (SYNTHROID) 50 MCG Tab, Take 50 mcg by mouth every morning on an empty stomach., Disp: , Rfl:     Empagliflozin (JARDIANCE) 25 MG Tab, Take  by mouth., Disp: , Rfl:     Liraglutide (VICTOZA SC), Inject  under the skin., Disp: , Rfl:     TRUEPLUS PEN NEEDLES 31G X 6 MM Misc, USE FOR INJECTING INSULIN ONE TIME PER DAY, Disp: , Rfl: 1    atorvastatin (LIPITOR) 20 MG Tab, atorvastatin 20 mg tablet  Take 1 tablet every day by oral route for 90 days., Disp: , Rfl:     lisinopril (PRINIVIL) 2.5 MG Tab, lisinopril 2.5 mg tablet  Take 1 tablet every day by oral route for 90 days., Disp: , Rfl:     metformin (GLUCOPHAGE) 1000 MG  tablet, metformin 1,000 mg tablet  Take 1 tablet twice a day by oral route., Disp: , Rfl:     insulin detemir (LEVEMIR FLEXTOUCH) 100 UNIT/ML injection PEN, Inject 20 Units as instructed 2 times a day., Disp: , Rfl:     ibuprofen (MOTRIN) 600 MG Tab, Take 1 Tablet by mouth every 6 hours as needed., Disp: 30 Tablet, Rfl: 1    aspirin 81 MG EC tablet, Take 1 Tab by mouth every day. (Patient not taking: Reported on 9/2/2021), Disp: 30 Tab, Rfl:   ALLERGIES: No Known Allergies  SURGHX:   Past Surgical History:   Procedure Laterality Date    MN HYSTEROSCOPY,DX,SEP PROC N/A 9/15/2021    Procedure: HYSTEROSCOPY, WITH VIDEO IMAGING;  Surgeon: Ilir Mcgee M.D.;  Location: SURGERY SAME DAY [facility];  Service: Gynecology    MN REMOVE INTRAUTERINE DEVICE N/A 9/15/2021    Procedure: REMOVAL, INTRAUTERINE DEVICE;  Surgeon: Ilir Mcgee M.D.;  Location: SURGERY SAME DAY [facility];  Service: Gynecology    DILATION AND CURETTAGE N/A 9/15/2021    Procedure: DILATION AND CURETTAGE;  Surgeon: Ilir Mcgee M.D.;  Location: SURGERY SAME DAY [facility];  Service: Gynecology    GYN SURGERY  1995    left breast lump removed    OTHER ABDOMINAL SURGERY  1989    gallbladder     SOCHX:  reports that she has never smoked. She has never used smokeless tobacco. She reports that she does not currently use alcohol. She reports that she does not use drugs.  FH:   Family History   Problem Relation Age of Onset    Clotting Disorder Neg Hx      Review of Systems   Constitutional:  Negative for chills and fever.   HENT:  Negative for sore throat.    Respiratory:  Negative for cough and shortness of breath.    Gastrointestinal:  Positive for abdominal pain. Negative for nausea and vomiting.   Genitourinary:  Negative for dysuria, flank pain, frequency and hematuria.   Musculoskeletal:  Negative for myalgias.   Skin:  Negative for rash.   Neurological:  Negative for dizziness.        Objective:   /68 (BP Location: Left arm, Patient  Position: Sitting, BP Cuff Size: Large adult)   Pulse 85   Temp 36.3 °C (97.4 °F) (Temporal)   Resp 16   Ht 1.524 m (5')   Wt 85 kg (187 lb 6.3 oz)   SpO2 96%   BMI 36.60 kg/m²   Physical Exam  Vitals and nursing note reviewed.   Constitutional:       General: She is not in acute distress.     Appearance: She is well-developed.   HENT:      Head: Normocephalic and atraumatic.      Right Ear: External ear normal.      Left Ear: External ear normal.      Nose: Nose normal.      Mouth/Throat:      Mouth: Mucous membranes are moist.   Eyes:      Conjunctiva/sclera: Conjunctivae normal.   Cardiovascular:      Rate and Rhythm: Normal rate.   Pulmonary:      Effort: Pulmonary effort is normal. No respiratory distress.      Breath sounds: Normal breath sounds.   Abdominal:      General: Bowel sounds are normal. There is no distension.      Tenderness: There is abdominal tenderness in the suprapubic area and left lower quadrant. There is guarding. There is no right CVA tenderness, left CVA tenderness or rebound. Negative signs include Gandara's sign and McBurney's sign.   Musculoskeletal:         General: Normal range of motion.   Skin:     General: Skin is warm and dry.   Neurological:      General: No focal deficit present.      Mental Status: She is alert and oriented to person, place, and time. Mental status is at baseline.      Gait: Gait (gait at baseline) normal.   Psychiatric:         Judgment: Judgment normal.           Assessment/Plan:   1. Left lower quadrant abdominal pain  - POCT Urinalysis    2. Pelvic pain  - POCT Urinalysis    3. Suprapubic abdominal pain  - POCT Urinalysis    Other orders  - gabapentin (NEURONTIN) 300 MG Cap; Take 300 mg by mouth 3 times a day.  - levothyroxine (SYNTHROID) 50 MCG Tab; Take 50 mcg by mouth every morning on an empty stomach.  - Empagliflozin (JARDIANCE) 25 MG Tab; Take  by mouth.  - Liraglutide (VICTOZA SC); Inject  under the skin.        Medical Decision  Making/Course:  In the context of the clinical presentation of persistent worsening abdominal pain and pelvic pain over the past 5 days and in the further context of the patient's comorbidities with significant physical exam findings, emergency department evaluation management is warranted.  The [facility] emergency department was advised and the transfer center notified and the patient requested transport by private vehicle.  In the course of preparing for this visit with review of the pertinent past medical history, recent and past clinic visits, current medications, and performing chart, immunization, medical history and medication reconciliation, and in the further course of obtaining the current history pertinent to the clinic visit today, performing an exam and evaluation, ordering and independently evaluating labs, tests  , and/or procedures, prescribing any recommended new medications as noted above, providing any pertinent counseling and education and recommending further coordination of care including contacting coordination with transfer center, at least  36 minutes of total time were spent during this encounter.        Please note that this dictation was created using voice recognition software. I have worked with consultants from the vendor as well as technical experts from [facility] to optimize the interface. I have made every reasonable attempt to correct obvious errors, but I expect that there are errors of grammar and possibly content that I did not discover before finalizing the note.

## 2024-04-23 NOTE — ED NOTES
Ambulatory from ED [facility] to [address], verified name and ,  matched with wrist band. Chart up for ERP to see.

## 2024-04-23 NOTE — ED TRIAGE NOTES
"Chief Complaint   Patient presents with    Back Pain     Lower back pain that started on Wednesday.    UTI     Pt went to  today, told she has a \"UTI that may have traveled to [place]\" and to come to the ED.       Pt is alert and oriented, speaking in full sentences, follows commands and responds appropriately to questions. Resperations are even and unlabored.      Pt placed in lobby. Pt educated on triage process. Pt encouraged to alert staff for any changes.     Patient and staff wearing appropriate PPE.    /69   Pulse 80   Temp 36.7 °C (98.1 °F) (Temporal)   Resp 18   Ht 1.524 m (5')   Wt 84.2 kg (185 lb 10 oz)   SpO2 99%      "

## 2024-04-25 LAB
BACTERIA UR CULT: NORMAL
SIGNIFICANT IND 70042: NORMAL
SITE SITE: NORMAL
SOURCE SOURCE: NORMAL

## 2024-06-21 ENCOUNTER — HOSPITAL ENCOUNTER (OUTPATIENT)
Dept: RADIOLOGY | Facility: MEDICAL CENTER | Age: 59
End: 2024-06-21
Attending: NURSE PRACTITIONER
Payer: COMMERCIAL

## 2024-06-21 DIAGNOSIS — R10.2 PELVIC PAIN IN FEMALE: ICD-10-CM

## 2024-06-21 PROCEDURE — 76830 TRANSVAGINAL US NON-OB: CPT

## (undated) DEVICE — SUCTION INSTRUMENT YANKAUER BULBOUS TIP W/O VENT (50EA/CA)

## (undated) DEVICE — SYRINGE 30 ML LL (56/BX)

## (undated) DEVICE — PROTECTOR ULNA NERVE - (36PR/CA)

## (undated) DEVICE — GLOVE SZ 7 BIOGEL PI MICRO - PF LF (50PR/BX 4BX/CA)

## (undated) DEVICE — KIT  I.V. START (100EA/CA)

## (undated) DEVICE — MASK ANESTHESIA ADULT  - (100/CA)

## (undated) DEVICE — SET LEADWIRE 5 LEAD BEDSIDE DISPOSABLE ECG (1SET OF 5/EA)

## (undated) DEVICE — ELECTRODE DUAL RETURN W/ CORD - (50/PK)

## (undated) DEVICE — TRAY SRGPRP PVP IOD WT PRP - (20/CA)

## (undated) DEVICE — KIT ANESTHESIA W/CIRCUIT & 3/LT BAG W/FILTER (20EA/CA)

## (undated) DEVICE — CATHETER IV 20 GA X 1-1/4 ---SURG.& SDS ONLY--- (50EA/BX)

## (undated) DEVICE — TUBING CLEARLINK DUO-VENT - C-FLO (48EA/CA)

## (undated) DEVICE — TUBING OUTFLOW HYSTEROSCPY (10EA/BX)

## (undated) DEVICE — LACTATED RINGERS INJ 1000 ML - (14EA/CA 60CA/PF)

## (undated) DEVICE — TUBING INFLOW HYSTEROSCOPY (10EA/CA)

## (undated) DEVICE — SENSOR SPO2 NEO LNCS ADHESIVE (20/BX) SEE USER NOTES

## (undated) DEVICE — CANISTER SUCTION 3000ML MECHANICAL FILTER AUTO SHUTOFF MEDI-VAC NONSTERILE LF DISP  (40EA/CA)

## (undated) DEVICE — SODIUM CHL IRRIGATION 0.9% 1000ML (12EA/CA)

## (undated) DEVICE — DRAPE UNDER BUTTOCKS FLUID - (20/CA)

## (undated) DEVICE — PAD SANITARY 11IN MAXI IND WRAPPED  (12EA/PK 24PK/CA)

## (undated) DEVICE — TUBE CONNECTING SUCTION - CLEAR PLASTIC STERILE 72 IN (50EA/CA)

## (undated) DEVICE — CANISTER SUCTION RIGID RED 1500CC (40EA/CA)

## (undated) DEVICE — SOLUTION SORBITOL 3000ML (4/CA)

## (undated) DEVICE — ELECTRODE 850 FOAM ADHESIVE - HYDROGEL RADIOTRNSPRNT (50/PK)

## (undated) DEVICE — SET EXTENSION WITH 2 PORTS (48EA/CA) ***PART #2C8610 IS A SUBSTITUTE*****

## (undated) DEVICE — Device

## (undated) DEVICE — TOWEL STOP TIMEOUT SAFETY FLAG (40EA/CA)

## (undated) DEVICE — WATER IRRIGATION STERILE 1000ML (12EA/CA)

## (undated) DEVICE — GLOVE BIOGEL INDICATOR SZ 6.5 SURGICAL PF LTX - (50PR/BX 4BX/CA)

## (undated) DEVICE — HEAD HOLDER JUNIOR/ADULT

## (undated) DEVICE — NEPTUNE 4 PORT MANIFOLD - (20/PK)

## (undated) DEVICE — NEEDLE SPINAL NON-SAFETY 22 GA X 3 (25EA/BX)"